# Patient Record
Sex: FEMALE | Race: ASIAN | Employment: OTHER | URBAN - METROPOLITAN AREA
[De-identification: names, ages, dates, MRNs, and addresses within clinical notes are randomized per-mention and may not be internally consistent; named-entity substitution may affect disease eponyms.]

---

## 2017-08-09 ENCOUNTER — HOSPITAL ENCOUNTER (OUTPATIENT)
Dept: MAMMOGRAPHY | Age: 72
Discharge: HOME | End: 2017-08-09
Attending: FAMILY MEDICINE
Payer: MEDICARE

## 2017-08-09 DIAGNOSIS — Z12.31 VISIT FOR SCREENING MAMMOGRAM: ICD-10-CM

## 2017-08-09 PROCEDURE — 77067 SCR MAMMO BI INCL CAD: CPT

## 2018-01-25 ENCOUNTER — HOSPITAL ENCOUNTER (OUTPATIENT)
Dept: BONE DENSITY | Age: 73
Discharge: HOME | End: 2018-01-25
Attending: FAMILY MEDICINE
Payer: MEDICARE

## 2018-01-25 DIAGNOSIS — Z12.31 VISIT FOR SCREENING MAMMOGRAM: ICD-10-CM

## 2018-01-25 PROCEDURE — 77080 DXA BONE DENSITY AXIAL: CPT

## 2018-08-22 ENCOUNTER — HOSPITAL ENCOUNTER (OUTPATIENT)
Dept: MAMMOGRAPHY | Age: 73
Discharge: HOME | End: 2018-08-22
Attending: FAMILY MEDICINE
Payer: MEDICARE

## 2018-08-22 DIAGNOSIS — Z12.39 BREAST CANCER SCREENING: ICD-10-CM

## 2018-08-22 PROCEDURE — 77063 BREAST TOMOSYNTHESIS BI: CPT

## 2018-08-22 PROCEDURE — 77067 SCR MAMMO BI INCL CAD: CPT

## 2019-09-30 ENCOUNTER — HOSPITAL ENCOUNTER (OUTPATIENT)
Dept: MAMMOGRAPHY | Age: 74
Discharge: HOME | End: 2019-09-30
Attending: FAMILY MEDICINE
Payer: MEDICARE

## 2019-09-30 DIAGNOSIS — Z12.31 VISIT FOR SCREENING MAMMOGRAM: ICD-10-CM

## 2019-09-30 PROCEDURE — 77063 BREAST TOMOSYNTHESIS BI: CPT

## 2019-11-04 ENCOUNTER — HOSPITAL ENCOUNTER (OUTPATIENT)
Dept: GENERAL RADIOLOGY | Age: 74
Discharge: HOME | End: 2019-11-04
Payer: MEDICARE

## 2019-11-04 DIAGNOSIS — M54.50 LOW BACK PAIN RADIATING TO LEFT LOWER EXTREMITY: ICD-10-CM

## 2019-11-04 DIAGNOSIS — M79.605 LOW BACK PAIN RADIATING TO LEFT LOWER EXTREMITY: ICD-10-CM

## 2019-11-04 PROCEDURE — 72110 X-RAY EXAM L-2 SPINE 4/>VWS: CPT

## 2020-10-21 ENCOUNTER — TRANSCRIBE ORDER (OUTPATIENT)
Dept: SCHEDULING | Age: 75
End: 2020-10-21

## 2020-10-21 DIAGNOSIS — Z78.0 MENOPAUSE: Primary | ICD-10-CM

## 2020-11-05 ENCOUNTER — HOSPITAL ENCOUNTER (OUTPATIENT)
Dept: BONE DENSITY | Age: 75
Discharge: HOME | End: 2020-11-05
Attending: FAMILY MEDICINE
Payer: MEDICARE

## 2020-11-05 ENCOUNTER — HOSPITAL ENCOUNTER (OUTPATIENT)
Dept: MAMMOGRAPHY | Age: 75
Discharge: HOME | End: 2020-11-05
Attending: FAMILY MEDICINE
Payer: MEDICARE

## 2020-11-05 DIAGNOSIS — Z12.31 ENCOUNTER FOR SCREENING MAMMOGRAM FOR BREAST CANCER: ICD-10-CM

## 2020-11-05 DIAGNOSIS — Z78.0 MENOPAUSE: ICD-10-CM

## 2020-11-05 PROCEDURE — 77080 DXA BONE DENSITY AXIAL: CPT

## 2020-11-05 PROCEDURE — 77063 BREAST TOMOSYNTHESIS BI: CPT

## 2020-11-05 PROCEDURE — 77067 SCR MAMMO BI INCL CAD: CPT

## 2021-05-20 ENCOUNTER — TRANSCRIBE ORDER (OUTPATIENT)
Dept: SCHEDULING | Age: 76
End: 2021-05-20

## 2021-05-20 DIAGNOSIS — Z12.31 VISIT FOR SCREENING MAMMOGRAM: Primary | ICD-10-CM

## 2021-11-08 ENCOUNTER — HOSPITAL ENCOUNTER (OUTPATIENT)
Dept: MAMMOGRAPHY | Age: 76
Discharge: HOME | End: 2021-11-08
Attending: FAMILY MEDICINE
Payer: MEDICARE

## 2021-11-08 DIAGNOSIS — Z12.31 VISIT FOR SCREENING MAMMOGRAM: ICD-10-CM

## 2021-11-08 PROCEDURE — 77063 BREAST TOMOSYNTHESIS BI: CPT

## 2022-04-13 ENCOUNTER — HOSPITAL ENCOUNTER (OUTPATIENT)
Dept: GENERAL RADIOLOGY | Age: 77
Discharge: HOME | End: 2022-04-13
Payer: MEDICARE

## 2022-04-13 DIAGNOSIS — M54.41 ACUTE BILATERAL LOW BACK PAIN WITH BILATERAL SCIATICA: ICD-10-CM

## 2022-04-13 DIAGNOSIS — M54.42 ACUTE BILATERAL LOW BACK PAIN WITH BILATERAL SCIATICA: ICD-10-CM

## 2022-04-13 PROCEDURE — 72114 X-RAY EXAM L-S SPINE BENDING: CPT

## 2022-06-14 ENCOUNTER — OFFICE VISIT (OUTPATIENT)
Dept: ORTHOPEDIC SURGERY | Age: 77
End: 2022-06-14
Payer: MEDICARE

## 2022-06-14 VITALS
WEIGHT: 134 LBS | HEART RATE: 86 BPM | BODY MASS INDEX: 24.66 KG/M2 | TEMPERATURE: 98 F | HEIGHT: 62 IN | OXYGEN SATURATION: 97 %

## 2022-06-14 DIAGNOSIS — R20.0 ARM NUMBNESS: ICD-10-CM

## 2022-06-14 DIAGNOSIS — M54.2 CERVICAL PAIN: ICD-10-CM

## 2022-06-14 DIAGNOSIS — M51.36 DDD (DEGENERATIVE DISC DISEASE), LUMBAR: ICD-10-CM

## 2022-06-14 DIAGNOSIS — M79.605 LUMBAR PAIN WITH RADIATION DOWN BOTH LEGS: ICD-10-CM

## 2022-06-14 DIAGNOSIS — M62.838 MUSCLE SPASM: ICD-10-CM

## 2022-06-14 DIAGNOSIS — M47.816 LUMBAR FACET ARTHROPATHY: ICD-10-CM

## 2022-06-14 DIAGNOSIS — M54.50 LUMBAR PAIN WITH RADIATION DOWN BOTH LEGS: Primary | ICD-10-CM

## 2022-06-14 DIAGNOSIS — M79.605 LUMBAR PAIN WITH RADIATION DOWN BOTH LEGS: Primary | ICD-10-CM

## 2022-06-14 DIAGNOSIS — M54.50 LUMBAR PAIN WITH RADIATION DOWN BOTH LEGS: ICD-10-CM

## 2022-06-14 DIAGNOSIS — M79.604 LUMBAR PAIN WITH RADIATION DOWN BOTH LEGS: ICD-10-CM

## 2022-06-14 DIAGNOSIS — M79.604 LUMBAR PAIN WITH RADIATION DOWN BOTH LEGS: Primary | ICD-10-CM

## 2022-06-14 PROCEDURE — G8399 PT W/DXA RESULTS DOCUMENT: HCPCS | Performed by: PHYSICAL MEDICINE & REHABILITATION

## 2022-06-14 PROCEDURE — G8420 CALC BMI NORM PARAMETERS: HCPCS | Performed by: PHYSICAL MEDICINE & REHABILITATION

## 2022-06-14 PROCEDURE — G8432 DEP SCR NOT DOC, RNG: HCPCS | Performed by: PHYSICAL MEDICINE & REHABILITATION

## 2022-06-14 PROCEDURE — 1123F ACP DISCUSS/DSCN MKR DOCD: CPT | Performed by: PHYSICAL MEDICINE & REHABILITATION

## 2022-06-14 PROCEDURE — 72040 X-RAY EXAM NECK SPINE 2-3 VW: CPT | Performed by: PHYSICAL MEDICINE & REHABILITATION

## 2022-06-14 PROCEDURE — G8536 NO DOC ELDER MAL SCRN: HCPCS | Performed by: PHYSICAL MEDICINE & REHABILITATION

## 2022-06-14 PROCEDURE — 1101F PT FALLS ASSESS-DOCD LE1/YR: CPT | Performed by: PHYSICAL MEDICINE & REHABILITATION

## 2022-06-14 PROCEDURE — 1090F PRES/ABSN URINE INCON ASSESS: CPT | Performed by: PHYSICAL MEDICINE & REHABILITATION

## 2022-06-14 PROCEDURE — 99203 OFFICE O/P NEW LOW 30 MIN: CPT | Performed by: PHYSICAL MEDICINE & REHABILITATION

## 2022-06-14 PROCEDURE — G8427 DOCREV CUR MEDS BY ELIG CLIN: HCPCS | Performed by: PHYSICAL MEDICINE & REHABILITATION

## 2022-06-14 RX ORDER — EMPAGLIFLOZIN 10 MG/1
TABLET, FILM COATED ORAL
COMMUNITY
Start: 2022-03-17

## 2022-06-14 RX ORDER — BIOTIN 1000 MCG
TABLET,CHEWABLE ORAL
COMMUNITY

## 2022-06-14 RX ORDER — UBIDECARENONE 30 MG
30 CAPSULE ORAL
COMMUNITY

## 2022-06-14 RX ORDER — LOSARTAN POTASSIUM 25 MG/1
TABLET ORAL
COMMUNITY
Start: 2022-03-17

## 2022-06-14 RX ORDER — ASPIRIN 325 MG
500 TABLET, DELAYED RELEASE (ENTERIC COATED) ORAL
COMMUNITY

## 2022-06-14 RX ORDER — ROSUVASTATIN CALCIUM 5 MG/1
TABLET, COATED ORAL
COMMUNITY
Start: 2022-03-17

## 2022-06-14 RX ORDER — LANOLIN ALCOHOL/MO/W.PET/CERES
400 CREAM (GRAM) TOPICAL DAILY
COMMUNITY

## 2022-06-14 RX ORDER — ISOPROPYL ALCOHOL 0.75 G/1
SWAB TOPICAL
COMMUNITY
Start: 2022-03-17

## 2022-06-14 RX ORDER — MELATONIN
1000 DAILY
COMMUNITY

## 2022-06-14 RX ORDER — METHYLPREDNISOLONE 4 MG/1
TABLET ORAL
Qty: 1 DOSE PACK | Refills: 0 | Status: SHIPPED | OUTPATIENT
Start: 2022-06-14

## 2022-06-14 RX ORDER — CALCIUM CITRATE/VITAMIN D3 200MG-6.25
TABLET ORAL
COMMUNITY
Start: 2022-03-17

## 2022-06-14 RX ORDER — IBUPROFEN 800 MG/1
TABLET ORAL
COMMUNITY
Start: 2022-04-06

## 2022-06-14 NOTE — PROGRESS NOTES
[facility]  [address] [place] [address], [facility] [address]  Phone: [phone]  Fax: [phone]    NEW PATIENT  Pt's YOB: 1945    ASSESSMENT   Diagnoses and all orders for this visit:    1. Lumbar pain with radiation down both legs  -     MRI LUMB SPINE WO CONT; Future    2. Lumbar facet arthropathy  -     methylPREDNISolone (MEDROL DOSEPACK) 4 mg tablet; Per dose pack instructions  -     MRI LUMB SPINE WO CONT; Future    3. DDD (degenerative disc disease), lumbar  -     MRI LUMB SPINE WO CONT; Future    4. Muscle spasm    5. Cervical pain  -     AMB POC XRAY, SPINE, CERVICAL; 2 OR 3    6. Arm numbness  -     AMB POC XRAY, SPINE, CERVICAL; 2 OR 3         IMPRESSION AND PLAN:  Pt is a 68 yo female with history of lumbar pain and bilateral radicular symptoms that has not improved with conservative care; she is having progressive symptoms including weakness and numbness ; she agrees to lumbar MRI to assess for stenosis, impingement and inflammation    1) Pt was given information on low back arthritis, cervical arthritis  Exercises and lumbar spinal stenosis information. 2) Discussed treatment options with the patient including medications, lumbar injections and indications for surgery. 3) Lumbar MRI ordered to assess for lumbar spinal stenosis, inflammation and other neuro-pathology;   4) Ms. Radha Robb has a reminder for a \"due or due soon\" health maintenance. I have asked that she contact her primary care provider, Herbert Ricardo NP, for follow-up on this health maintenance. 5)  demonstrated consistency with prescribing. 6) Pt given a medrol dose pack to address inflammation  7) Cervical 2V x-rays obtained to check for degenerative changes due to concerns with her balance  Follow-up and Dispositions    · Return in about 4 weeks (around 7/12/2022) for Diagnostic Test follow up, Medication follow up.            HISTORY OF PRESENT ILLNESS:  Thelma Henson Alexander King is a 67 y.o. R hand dominant female with history of lumbar pain x 5 months. Pt presents to the office today as a new patient referred by [facility], NP. Pt's symptoms worsen with standing, walking and lifting and improve with sitting, lying, and bending. Pt has used MDP and ibuprofen in the past with some benefit but does not take medication on a regular basis. She does admit to leg weakness and is having more difficulty with ambulation. She also reports some difficulty with her balance and has difficulty with picking up coins. She also has some aching in her LUE and rates her pain as 5/10 and describes her pain as intermittent, aching and has numbness in her legs and UE. She has not had any previous lumbar surgery. Review of the records demonstrates (02/06/2021) creatinine of 0.6 and eGFR of > 60. Pt desires to proceed with lumbar MRI and MDP and declines other medications. Pain Scale: 5/10     PCP: Ravinder Engle NP    Past Medical History:   Diagnosis Date    Diabetes mellitus ([address]     states diet controlled    High blood pressure     Menopause         Social History     Socioeconomic History    Marital status:      Spouse name: Not on file    Number of children: Not on file    Years of education: Not on file    Highest education level: Not on file   Occupational History    Not on file   Tobacco Use    Smoking status: Never Smoker    Smokeless tobacco: Never Used   Substance and Sexual Activity    Alcohol use: No    Drug use: No    Sexual activity: Not on file   Other Topics Concern    Not on file   Social History Narrative    Not on file     Social Determinants of Health     Financial Resource Strain:     Difficulty of Paying Living Expenses: Not on file   Food Insecurity:     Worried About Running Out of Food in the Last Year: Not on file    Rajesh of Food in the Last Year: Not on file   Transportation Needs:     Lack of Transportation (Medical):  Not on file  Lack of Transportation (Non-Medical): Not on file   Physical Activity:     Days of Exercise per Week: Not on file    Minutes of Exercise per Session: Not on file   Stress:     Feeling of Stress : Not on file   Social Connections:     Frequency of Communication with Friends and Family: Not on file    Frequency of Social Gatherings with Friends and Family: Not on file    Attends [employer] Services: Not on file    Active Member of [address] [place] [address] or Organizations: Not on file    Attends Club or Organization Meetings: Not on file    Marital Status: Not on file   Intimate Partner Violence:     Fear of Current or Ex-Partner: Not on file    Emotionally Abused: Not on file    Physically Abused: Not on file    Sexually Abused: Not on file   Housing Stability:     Unable to Pay for Housing in the Last Year: Not on file    Number of [place] in the Last Year: Not on file    Unstable Housing in the Last Year: Not on file       Current Outpatient Medications   Medication Sig Dispense Refill    BD Single Use Swabs Regular padm       biotin 1,000 mcg chew Take  by mouth.  True Metrix Glucose Test Strip strip       cholecalciferol (VITAMIN D3) (1000 Units /25 mcg) tablet Take 1,000 Units by mouth daily.  Jardiance 10 mg tablet       ibuprofen (MOTRIN) 800 mg tablet TAKE 1 TABLET BY MOUTH EVERY 8 HOURS WITH FOOD AS NEEDED      losartan (COZAAR) 25 mg tablet       magnesium oxide (MAG-OX) 400 mg tablet Take 400 mg by mouth daily.  multivitamins-minerals-lutein (MEN'S CENTRUM SILVER WITH LUTEIN) tab tablet Take 1 Tablet by mouth daily.  omega 3-dha-epa-fish oil 60- mg cap Take 500 mg by mouth.  rosuvastatin (CRESTOR) 5 mg tablet       coenzyme Q-10 (CO Q-10) 30 mg capsule Take 30 mg by mouth.  methylPREDNISolone (MEDROL DOSEPACK) 4 mg tablet Per dose pack instructions 1 Dose Pack 0    AMLODIPINE BESYLATE (NORVASC PO) Take 5 mg by mouth daily.       indapamide (LOZOL) 1.25 mg tablet Take 1.25 mg by mouth daily.  SULFAMETHOXAZOLE/TRIMETHOPRIM (BACTRIM DS PO) Take  by mouth. (Patient not taking: Reported on 6/14/2022)         Allergies   Allergen Reactions    Codeine Itching    Lisinopril Itching       REVIEW OF SYSTEMS    Constitutional: Negative for fever, chills, or weight change. Respiratory: Negative for cough or shortness of breath. Cardiovascular: Negative for chest pain or palpitations. Gastrointestinal: Negative for acid reflux, change in bowel habits, or constipation. Genitourinary: Negative for dysuria and flank pain. Musculoskeletal: Positive for lumbar pain. Skin: Negative for rash. Neurological: Negative for headaches, dizziness; positive for leg numbness.(L>R) and arm numbness; positive for balance issues  Endo/Heme/Allergies: Negative for increased bruising. Psychiatric/Behavioral: Negative for difficulty with sleep. As per HPI    PHYSICAL EXAMINATION  Visit Vitals  Pulse 86   Temp 98 °F (36.7 °C) (Temporal)   Ht 5' 2\" (1.575 m)   Wt 134 lb (60.8 kg)   SpO2 97%   BMI 24.51 kg/m²       Constitutional: Awake, alert, and in no acute distress. HEENT: Normocephalic. Atraumatic. Oropharynx is moist and clear. PERRL. EOMI. Sclerae are nonicteric  Cardiovascular: Regular rate and rhythm  Lungs: Clear to auscultation bilaterally  Abdomen: Soft and nontender. Bowel sounds are present  Neurological: 1+ symmetrical DTRs in the upper extremities. 1+ symmetrical DTRs in the lower extremities. Sensation to light touch is intact. Negative Garrison's sign bilaterally. Skin: warm, dry, and intact. Musculoskeletal: Mild pain with extension, axial loading, and better with forward flexion. No pain with internal or external rotation of her hips. Negative straight leg raise bilaterally.        Biceps  Triceps Deltoids Wrist Ext Wrist Flex Hand Intrin   Right +4/5 +4/5 +4/5 +4/5 +4/5 +4/5   Left +4/5 +4/5 +4/5 +4/5 +4/5 +4/5      Hip Flex  Quads Hamstrings Ankle DF EHL Ankle PF   Right +4/5 +4/5 +4/5 +4/5 +4/5 +4/5   Left +4/5 +4/5 +4/5 +4/5 +4/5 +4/5     IMAGING:  Lumbar x-rays from 04/13/2022 were personally reviewed and demonstrated:    Results  XR SPINE LUMB COMP W BEND (Accession [id number]) (Order [id number])    Allergies       Medium: Codeine; Lisinopril     Exam Information    Status Exam Begun  Exam Ended    Final [99] 4/13/2022 17:04 4/13/2022  5:16 PM [id number]  5:16 PM     Result Information    Status: Final result (Exam End: 4/13/2022 17:16) Provider Status: Open     XR SPINE LUMB COMP W BEND: Patient Communication    Add Comments  Not seen       Study Result    Narrative & Impression   XR SPINE LUMB COMP W BEND: 4/13/2022 5:16 PM     CLINICAL INFORMATION  Acute bilateral low back pain with bilateral sciatica.     COMPARISON  Lumbar spine MRI August 9, 2012, lumbar spine radiographs for November 2019     TECHNIQUE  7 views of the lumbar spine     FINDINGS:  Mild levoconvex curvature of the lumbar spine.     Reversal of the expected lumbar lordosis.     Vertebral body heights are preserved.     Grade 1 anterolisthesis of T11 with respect to T12 and T12 with respect L1.     Grade 1 retrolisthesis of L3 with respect L4 and L4 with respect L5.     No subluxation on dynamic imaging.     No spondylolysis.     Moderate and/or severe discogenic degenerative change throughout the lumbar  spine with intervertebral disc space narrowing and endplate osteophytosis.     IMPRESSION  No acute osseous findings.     Advanced discogenic degenerative change with multilevel grade 1  spondylolisthesis and reversal of the expected lumbar lordosis.  Similar to 2019  exam.       Cervical 2V x-ray 06/14/2022 were personally reviewed and demonstrated:  Straightening of the cervical spine, anterior osteophyte C4/5 and degenerative cervical facets

## 2022-06-14 NOTE — PATIENT INSTRUCTIONS
Low Back Arthritis: Exercises  Introduction  Here are some examples of typical rehabilitation exercises for your condition. Start each exercise slowly. Ease off the exercise if you start to have pain. Your doctor or physical therapist will tell you when you can start these exercises and which ones will work best for you. When you are not being active, find a comfortable position for rest. Some people are comfortable on the floor or a medium-firm bed with a small pillow under their head and another under their knees. Some people prefer to lie on their side with a pillow between their knees. Don't stay in one position for too long. Take short walks (10 to 20 minutes) every 2 to 3 hours. Avoid slopes, hills, and stairs until you feel better. Walk only distances you can manage without pain, especially leg pain. How to do the exercises  Pelvic tilt    1. Lie on your back with your knees bent. 2. \"Brace\" your stomachtighten your muscles by pulling in and imagining your belly button moving toward your spine. 3. Press your lower back into the floor. You should feel your hips and pelvis rock back. 4. Hold for 6 seconds while breathing smoothly. 5. Relax and allow your pelvis and hips to rock forward. 6. Repeat 8 to 12 times. Back stretches    1. Get down on your hands and knees on the floor. 2. Relax your head and allow it to droop. Round your back up toward the ceiling until you feel a nice stretch in your upper, middle, and lower back. Hold this stretch for as long as it feels comfortable, or about 15 to 30 seconds. 3. Return to the starting position with a flat back while you are on your hands and knees. 4. Let your back sway by pressing your stomach toward the floor. Lift your buttocks toward the ceiling. 5. Hold this position for 15 to 30 seconds. 6. Repeat 2 to 4 times. Follow-up care is a key part of your treatment and safety.  Be sure to make and go to all appointments, and call your doctor if you are having problems. It's also a good idea to know your test results and keep a list of the medicines you take. Where can you learn more? Go to http://www.nuPSYS.com/  Enter T094 in the search box to learn more about \"Low Back Arthritis: Exercises. \"  Current as of: July 1, 2021               Content Version: 13.2  © 2006-2022 [employer]. Care instructions adapted under license by [employer] (which disclaims liability or warranty for this information). If you have questions about a medical condition or this instruction, always ask your healthcare professional. [place] [address] any warranty or liability for your use of this information. Lumbar Spinal Stenosis: Care Instructions  Your Care Instructions     Stenosis in the spine is a narrowing of the canal that is around the spinal cord and nerve roots in your back. It can happen as part of aging. Sometimes bone and other tissue grow into this canal and press on the nerves that branch out from the spinal cord. This can cause pain, numbness, and weakness. When it happens in the lower part of your back, it is called lumbar spinal stenosis. It can cause problems in the legs, feet, and rear end (buttocks). You may be able to get relief from the symptoms of spinal stenosis by taking pain medicine. Your doctor may suggest physical therapy and exercises to keep your spine strong and flexible. Some people try steroid shots to reduce swelling. If pain and numbness in your legs are still so bad that you cannot do your normal activities, you may need surgery. Follow-up care is a key part of your treatment and safety. Be sure to make and go to all appointments, and call your doctor if you are having problems. It's also a good idea to know your test results and keep a list of the medicines you take. How can you care for yourself at home? · Take an over-the-counter pain medicine.  Nonsteroidal anti-inflammatory drugs (NSAIDs) such as ibuprofen or naproxen seem to work best. But if you can't take NSAIDs, you can try acetaminophen. Be safe with medicines. Read and follow all instructions on the label. · Do not take two or more pain medicines at the same time unless the doctor told you to. Many pain medicines have acetaminophen, which is Tylenol. Too much acetaminophen (Tylenol) can be harmful. · Stay at a healthy weight. Being overweight puts extra strain on your spine. · Change positions often when you sit or stand. This can ease pain. It may also reduce pressure on the spinal cord and its nerves. · Avoid doing things that make your symptoms worse. Walking downhill and standing for a long time may cause pain. · Stretch and strengthen your back muscles as your doctor or physical therapist recommends. If your doctor says it is okay to do them, these exercises may help. ? Lie on your back with your knees bent. Gently pull one bent knee to your chest. Put that foot back on the floor, and then pull the other knee to your chest.  ? Do pelvic tilts. Lie on your back with your knees bent. Tighten your stomach muscles. Pull your belly button (navel) in and up toward your ribs. You should feel like your back is pressing to the floor and your hips and pelvis are slightly lifting off the floor. Hold for 6 seconds while breathing smoothly. ? Stand with your back flat against a wall. Slowly slide down until your knees are slightly bent. Hold for 10 seconds, then slide back up the wall. · Remove or change anything in your house that may cause you to fall. Keep walkways clear of clutter, electrical cords, and throw rugs. When should you call for help? Call 911 anytime you think you may need emergency care. For example, call if:    · You are unable to move a leg at all. Call your doctor now or seek immediate medical care if:    · You have new or worse symptoms in your legs, belly, or buttocks.  Symptoms may include:  ? Numbness or tingling. ? Weakness. ? Pain.     · You lose bladder or bowel control. Watch closely for changes in your health, and be sure to contact your doctor if:    · You have a fever, lose weight, or don't feel well.     · You are not getting better as expected. Where can you learn more? Go to http://www.gray.com/  Enter X327 in the search box to learn more about \"Lumbar Spinal Stenosis: Care Instructions. \"  Current as of: July 1, 2021               Content Version: 13.2  © 3375-9041 [employer]. Care instructions adapted under license by [employer] (which disclaims liability or warranty for this information). If you have questions about a medical condition or this instruction, always ask your healthcare professional. [address] any warranty or liability for your use of this information. Neck Arthritis: Exercises  Introduction  Here are some examples of exercises for you to try. The exercises may be suggested for a condition or for rehabilitation. Start each exercise slowly. Ease off the exercises if you start to have pain. You will be told when to start these exercises and which ones will work best for you. How to do the exercises  Neck stretches to the side    1. This stretch works best if you keep your shoulder down as you lean away from it. To help you remember to do this, start by relaxing your shoulders and lightly holding on to your thighs or your chair. 2. Tilt your head toward your shoulder and hold for 15 to 30 seconds. Let the weight of your head stretch your muscles. 3. Repeat 2 to 4 times toward each shoulder. Chin tuck    1. Lie on the floor with a rolled-up towel under your neck. Your head should be touching the floor. 2. Slowly bring your chin toward your chest.  3. Hold for a count of 6, and then relax for up to 10 seconds. 4. Repeat 8 to 12 times. Active cervical rotation    1.  Sit in a firm chair, or stand up straight. 2. Keeping your chin level, turn your head to the right, and hold for 15 to 30 seconds. 3. Turn your head to the left and hold for 15 to 30 seconds. 4. Repeat 2 to 4 times to each side. Shoulder blade squeeze    1. While standing, squeeze your shoulder blades together. 2. Do not raise your shoulders up as you are squeezing. 3. Hold for 6 seconds. 4. Repeat 8 to 12 times. Shoulder rolls    1. Sit comfortably with your feet shoulder-width apart. You can also do this exercise standing up. 2. Roll your shoulders up, then back, and then down in a smooth, circular motion. 3. Repeat 2 to 4 times. Follow-up care is a key part of your treatment and safety. Be sure to make and go to all appointments, and call your doctor if you are having problems. It's also a good idea to know your test results and keep a list of the medicines you take. Where can you learn more? Go to http://www.gray.com/  Enter Y188 in the search box to learn more about \"Neck Arthritis: Exercises. \"  Current as of: July 1, 2021               Content Version: 13.2  © 2006-2022 [employer]. Care instructions adapted under license by [employer] (which disclaims liability or warranty for this information). If you have questions about a medical condition or this instruction, always ask your healthcare professional. [place] [address] any warranty or liability for your use of this information.

## 2022-06-14 NOTE — PROGRESS NOTES
"    11/17/2021         RE: Norma Paul  [address] [facility] [address]        Dear Colleague,    Thank you for referring your patient, Norma Paul, to the [facility]. Please see a copy of my visit note below.    Oncology Rooming Note    November 17, 2021 1:26 PM   Norma Paul is a 75 year old female who presents for:    Chief Complaint   Patient presents with     Oncology Clinic Visit     Malignant neoplasm of female breast, unspecified estrogen receptor status, unspecified laterality, unspecified site of breast (H)     Initial Vitals: BP (!) 160/98 (BP Location: Right arm, Patient Position: Sitting, Cuff Size: Adult Regular)   Pulse 80   Temp 98.1  F (36.7  C) (Oral)   Resp 12   Wt 68.5 kg (151 lb)   SpO2 97%   BMI 25.92 kg/m   Estimated body mass index is 25.92 kg/m  as calculated from the following:    Height as of 5/3/21: 1.626 m (5' 4\").    Weight as of this encounter: 68.5 kg (151 lb). Body surface area is 1.76 meters squared.  No Pain (0) Comment: Data Unavailable   No LMP recorded. Patient is postmenopausal.  Allergies reviewed: Yes  Medications reviewed: Yes    Medications: Medication refills not needed today.  Pharmacy name entered into Dig[employer]:    [id number] - [facility], [place] - [address]  [id number] - [place], [place] - [address] [facility] (MAIL SERVICE) [facility] - [place], [place] - [address] AT [address]  [facility] [address] [facility]    Clinical concerns:  Malignant neoplasm of female breast, unspecified estrogen receptor status, unspecified laterality, unspecified site of breast (H)      Lashon Weaver, [facility] Hematology and Oncology Progress Note    Patient: Norma Paul  MRN: [id number]  Date of Service: 11/17/2021        Reason for Visit    Chief Complaint   Patient presents with     Oncology Clinic Visit " David Magaña presents today for   Chief Complaint   Patient presents with    Arm Pain    Leg Pain    Back Pain       Is someone accompanying this pt? no    Is the patient using any DME equipment during OV? no    Depression Screening:  No flowsheet data found. Learning Assessment:  No flowsheet data found. Abuse Screening:  No flowsheet data found. Fall Risk  No flowsheet data found. OPIOID RISK TOOL  No flowsheet data found. Coordination of Care:  1. Have you been to the ER, urgent care clinic since your last visit? no  Hospitalized since your last visit? no    2. Have you seen or consulted any other health care providers outside of the [address] [place] [address] since your last visit? no Include any pap smears or colon screening.  no     Malignant neoplasm of female breast, unspecified estrogen receptor status, unspecified laterality, unspecified site of breast (H)       Assessment and Plan    Cancer Staging  No matching staging information was found for the patient.    1. Breast cancer: stage IA. . Oncotype 12. She is taking anastrozole and tolerating well. Started January 2017.  Mammogram in October was normal, will continue that annually. She will return in 6 months clinical breast exam.  She will then just be seen once a year in May.  She will then continue to get her mammograms in the fall around October/November.  She will stop her anastrozole in February 2022.     2.  osteopenia: she is at high risk for osteoporosis with aromatase inhibitors. She is taking calcium and vit d. Her last DEXA is stable and still showing osteopenia. We will continue calcium/vit d. Encourage weight bearing exercises. Repeat DEXA in 2022.     ECOG Performance    0 - Independent    Distress Screening (within last 30 days)    1. How concerned are you about your ability to eat? : 0  2. How concerned are you about unintended weight loss or your current weight? : 0  3. How concerned are you about feeling depressed or very sad? : 0  4. How concerned are you about feeling anxious or very scared? : 0  5. Do you struggle with the loss of meaning and jose alejandro in your life? : Not at all  6. How concerned are you about work and home life issues that may be affected by your cancer? : 0  7. How concerned are you about knowing what resources are available to help you? : 0  8. Do you currently have what you would describe as Buddhist or spiritual struggles?            : Not at all       Pain  Pain Score: No Pain (0)    Problem List    Patient Active Problem List   Diagnosis     Malignant neoplasm of female breast, unspecified estrogen receptor status, unspecified laterality, unspecified site of breast (H)     Urinary retention     Hyponatremia     JOE (acute kidney injury) (H)      Leukocytosis     Urinary tract infection     Uterine prolapse     Hydronephrosis     Sepsis (H)     Dehydration     Renal stone     Pelvic abscess in female     Diverticulitis of large intestine with perforation and abscess without bleeding     Hypomagnesemia     Hypokalemia     Slow transit constipation     Diverticulitis     Aromatase inhibitor use     Vaginal vault prolapse        ______________________________________________________________________________    History of Present Illness    Measurable disease: None postoperatively     Current therapy: Anastrozole 1 mg by mouth daily started January 1, 2017     Treatment history: Left lumpectomy and then adjuvant radiation therapy, 16 fractions completed December 2016     Interim History: pt is here today for follow up visit. She is doing well. Tolerating anastrozole well. No new issues with breast.     Review of Systems    Pertinent items are noted in HPI.    Past History    Past Medical History:   Diagnosis Date     Breast cancer (H) 2016    right ca     Cystocele with prolapse      Hx of radiation therapy      Hyperlipidemia      Indwelling catheter present on admission      Prolapse, uterovaginal        PHYSICAL EXAM  BP (!) 160/98 (BP Location: Right arm, Patient Position: Sitting, Cuff Size: Adult Regular)   Pulse 80   Temp 98.1  F (36.7  C) (Oral)   Resp 12   Wt 68.5 kg (151 lb)   SpO2 97%   BMI 25.92 kg/m      GENERAL: no acute distress. Cooperative in conversation. Here alone. Mask on  RESP: Regular respiratory rate. No expiratory wheezes   MUSCULOSKELETAL: no bilateral leg swelling  NEURO: non focal. Alert and oriented x3.   PSYCH: within normal limits. No depression or anxiety.  SKIN: exposed skin is dry intact.     Lab Results    No results found for this or any previous visit (from the past 168 hour(s)).    Imaging    MA Screen Bilateral w/Glen    Result Date: 10/26/2021  BILATERAL FULL FIELD DIGITAL SCREENING MAMMOGRAM WITH TOMOSYNTHESIS  Performed on: 10/26/21 Compared to: 10/19/2020, 10/17/2019, 10/16/2018, and 10/10/2017 Technique:  This study was evaluated with the assistance of Computer-Aided Detection.  Breast Tomosynthesis was used in interpretation. Findings: The breasts are heterogeneously dense, which may obscure small masses.  There are breast conservation changes in the right breast. There is no radiographic evidence of malignancy. IMPRESSION: ACR BI-RADS Category 2: Benign RECOMMENDED FOLLOW-UP: Annual routine screening mammogram The results and recommendations of this examination will be communicated to the patient.         Signed by: CRYSTAL Miranda CNP      Again, thank you for allowing me to participate in the care of your patient.        Sincerely,        CRYSTAL Miranda CNP

## 2022-06-27 ENCOUNTER — TELEPHONE (OUTPATIENT)
Dept: ORTHOPEDIC SURGERY | Age: 77
End: 2022-06-27

## 2022-06-27 NOTE — TELEPHONE ENCOUNTER
Arpita Greer from [facility] pre authorization department called stating that the office notes need to be signed by doctor so the patient can be authorized for mri.  Arpita Greer contact [phone]

## 2022-06-30 ENCOUNTER — HOSPITAL ENCOUNTER (OUTPATIENT)
Age: 77
Discharge: HOME | End: 2022-06-30
Attending: PHYSICAL MEDICINE & REHABILITATION
Payer: MEDICARE

## 2022-06-30 PROCEDURE — 72148 MRI LUMBAR SPINE W/O DYE: CPT

## 2022-08-10 ENCOUNTER — OFFICE VISIT (OUTPATIENT)
Dept: ORTHOPEDIC SURGERY | Age: 77
End: 2022-08-10
Payer: MEDICARE

## 2022-08-10 VITALS — HEART RATE: 80 BPM | TEMPERATURE: 96.8 F | BODY MASS INDEX: 23.78 KG/M2 | WEIGHT: 130 LBS | OXYGEN SATURATION: 95 %

## 2022-08-10 DIAGNOSIS — M54.50 LUMBAR PAIN WITH RADIATION DOWN BOTH LEGS: Primary | ICD-10-CM

## 2022-08-10 DIAGNOSIS — M54.16 LUMBAR RADICULITIS: ICD-10-CM

## 2022-08-10 DIAGNOSIS — M79.604 LUMBAR PAIN WITH RADIATION DOWN BOTH LEGS: Primary | ICD-10-CM

## 2022-08-10 DIAGNOSIS — M79.605 LUMBAR PAIN WITH RADIATION DOWN BOTH LEGS: Primary | ICD-10-CM

## 2022-08-10 DIAGNOSIS — M50.30 DDD (DEGENERATIVE DISC DISEASE), CERVICAL: ICD-10-CM

## 2022-08-10 DIAGNOSIS — M62.838 MUSCLE SPASM: ICD-10-CM

## 2022-08-10 DIAGNOSIS — M47.816 LUMBAR FACET ARTHROPATHY: ICD-10-CM

## 2022-08-10 PROCEDURE — 1090F PRES/ABSN URINE INCON ASSESS: CPT | Performed by: PHYSICAL MEDICINE & REHABILITATION

## 2022-08-10 PROCEDURE — 1123F ACP DISCUSS/DSCN MKR DOCD: CPT | Performed by: PHYSICAL MEDICINE & REHABILITATION

## 2022-08-10 PROCEDURE — G8420 CALC BMI NORM PARAMETERS: HCPCS | Performed by: PHYSICAL MEDICINE & REHABILITATION

## 2022-08-10 PROCEDURE — G8427 DOCREV CUR MEDS BY ELIG CLIN: HCPCS | Performed by: PHYSICAL MEDICINE & REHABILITATION

## 2022-08-10 PROCEDURE — G8399 PT W/DXA RESULTS DOCUMENT: HCPCS | Performed by: PHYSICAL MEDICINE & REHABILITATION

## 2022-08-10 PROCEDURE — 1101F PT FALLS ASSESS-DOCD LE1/YR: CPT | Performed by: PHYSICAL MEDICINE & REHABILITATION

## 2022-08-10 PROCEDURE — G8536 NO DOC ELDER MAL SCRN: HCPCS | Performed by: PHYSICAL MEDICINE & REHABILITATION

## 2022-08-10 PROCEDURE — G8432 DEP SCR NOT DOC, RNG: HCPCS | Performed by: PHYSICAL MEDICINE & REHABILITATION

## 2022-08-10 PROCEDURE — 99214 OFFICE O/P EST MOD 30 MIN: CPT | Performed by: PHYSICAL MEDICINE & REHABILITATION

## 2022-08-10 RX ORDER — GABAPENTIN 100 MG/1
CAPSULE ORAL
Qty: 60 CAPSULE | Refills: 1 | Status: SHIPPED | OUTPATIENT
Start: 2022-08-10

## 2022-08-10 NOTE — PATIENT INSTRUCTIONS
Herniated Disc: Exercises  Introduction  Here are some examples of exercises for you to try. The exercises may be suggested for a condition or for rehabilitation. Start each exercise slowly. Ease off the exercises if you start to have pain. You will be told when to start these exercises and which ones will work best for you. How to stay safe  These exercises can help you move easier and feel better. But when you first start doing them, you may have more pain in your back. This is normal. But it is important to pay close attention to your pain during and after each exercise. Keep doing these exercises if your pain stays the same or moves from your leg and buttock more toward the middle of your spine. Pain moving out of your leg and buttock is a good sign. Stop doing these exercises if your pain gets worse in your leg and buttock. Stop if you start to have pain in your leg and buttock that you didn't have before. Be sure to do these exercises in the order they appear. Note how your pain changes before you move to the next one. If your pain is much worse right after exercise and stays worse the next day, do not do any of these exercises. How to do the exercises  1. Rest on belly    Lie on your stomach, with your head turned to the side. Try to relax your lower back muscles as much as you can. Continue to lie on your stomach for 2 minutes. Keep your arms beside your body. If that position bothers your neck, place your hands, one on top of the other, underneath your forehead. This will help support your head and neck. If lying in this position causes or increases pain down your leg, stop this exercise and do not do the next exercises. 2. Press-up back extension    Lie on your stomach, with your face down and your elbows tucked into your sides and under your shoulders. Press your elbows down into the floor to raise your upper back. Hold this position for 15 to 30 seconds. Repeat 2 to 4 times.   As you do this, relax your stomach muscles and allow your back to arch without using your back muscles. Let your low back relax completely as you arch up. Don't let your hips or pelvis come off the floor. Then relax, and return to the start position. Over time, work up to staying in the press-up position for up to 2 minutes. If lying in this position causes or increases pain down your leg, stop this exercise and do not do the next exercises. 3. Full press-up back extension    Lie on your stomach with your face down, keeping your elbows tucked into your sides and under your shoulders. Straighten your elbows, and push your upper body up as far as you can. Hold this position for 5 seconds, and then relax. Repeat 10 times. Allow your lower back to sag. Keep your hips, pelvis, and legs relaxed. Each time, try to raise your upper body a little higher and hold your arms a bit straighter. If lying in this position causes or increases pain down your leg, stop this exercise and do not do the next exercises. If you can't do this exercise, you may instead try the backward bend exercise that follows. 4. Backward bend    Stand with your feet hip-width apart, and don't lock your knees. Place your hands in the small of your back. Bend backward as far as you can, keeping your knees straight. Repeat 2 to 4 times. Your toes should be pointing forward. Hold this position for 2 to 3 seconds. Then return to your starting position. Each time, try to bend backward a little farther, until you bend backward as far as you can. If standing in this position causes or increases pain down your leg, stop this exercise. Follow-up care is a key part of your treatment and safety. Be sure to make and go to all appointments, and call your doctor if you are having problems. It's also a good idea to know your test results and keep a list of the medicines you take. Where can you learn more?   Go to http://www.gray.com/  Enter X649 in the search box to learn more about \"Herniated Disc: Exercises. \"  Current as of: July 1, 2021               Content Version: 13.2  © 2006-2022 [employer]. Care instructions adapted under license by [employer] (which disclaims liability or warranty for this information). If you have questions about a medical condition or this instruction, always ask your healthcare professional. [place] [address] any warranty or liability for your use of this information. Neck Arthritis: Exercises  Introduction  Here are some examples of exercises for you to try. The exercises may be suggested for a condition or for rehabilitation. Start each exercise slowly. Ease off the exercises if you start to have pain. You will be told when to start these exercises and which ones will work best for you. How to do the exercises  Neck stretches to the side    This stretch works best if you keep your shoulder down as you lean away from it. To help you remember to do this, start by relaxing your shoulders and lightly holding on to your thighs or your chair. Tilt your head toward your shoulder and hold for 15 to 30 seconds. Let the weight of your head stretch your muscles. Repeat 2 to 4 times toward each shoulder. Chin tuck    Lie on the floor with a rolled-up towel under your neck. Your head should be touching the floor. Slowly bring your chin toward your chest.  Hold for a count of 6, and then relax for up to 10 seconds. Repeat 8 to 12 times. Active cervical rotation    Sit in a firm chair, or stand up straight. Keeping your chin level, turn your head to the right, and hold for 15 to 30 seconds. Turn your head to the left and hold for 15 to 30 seconds. Repeat 2 to 4 times to each side. Shoulder blade squeeze    While standing, squeeze your shoulder blades together. Do not raise your shoulders up as you are squeezing.   Hold for 6 seconds. Repeat 8 to 12 times. Shoulder rolls    Sit comfortably with your feet shoulder-width apart. You can also do this exercise standing up. Roll your shoulders up, then back, and then down in a smooth, circular motion. Repeat 2 to 4 times. Follow-up care is a key part of your treatment and safety. Be sure to make and go to all appointments, and call your doctor if you are having problems. It's also a good idea to know your test results and keep a list of the medicines you take. Where can you learn more? Go to http://www.gray.com/  Enter W961 in the search box to learn more about \"Neck Arthritis: Exercises. \"  Current as of: July 1, 2021               Content Version: 13.2  © 2006-2022 [employer]. Care instructions adapted under license by [employer] (which disclaims liability or warranty for this information). If you have questions about a medical condition or this instruction, always ask your healthcare professional. [place] [address] any warranty or liability for your use of this information. Low Back Arthritis: Exercises  Introduction  Here are some examples of typical rehabilitation exercises for your condition. Start each exercise slowly. Ease off the exercise if you start to have pain. Your doctor or physical therapist will tell you when you can start these exercises and which ones will work best for you. When you are not being active, find a comfortable position for rest. Some people are comfortable on the floor or a medium-firm bed with a small pillow under their head and another under their knees. Some people prefer to lie on their side with a pillow between their knees. Don't stay in one position for too long. Take short walks (10 to 20 minutes) every 2 to 3 hours. Avoid slopes, hills, and stairs until you feel better. Walk only distances you can manage without pain, especially leg pain.   How to do the exercises  Pelvic tilt    Lie on your back with your knees bent. \"Brace\" your stomach--tighten your muscles by pulling in and imagining your belly button moving toward your spine. Press your lower back into the floor. You should feel your hips and pelvis rock back. Hold for 6 seconds while breathing smoothly. Relax and allow your pelvis and hips to rock forward. Repeat 8 to 12 times. Back stretches    Get down on your hands and knees on the floor. Relax your head and allow it to droop. Round your back up toward the ceiling until you feel a nice stretch in your upper, middle, and lower back. Hold this stretch for as long as it feels comfortable, or about 15 to 30 seconds. Return to the starting position with a flat back while you are on your hands and knees. Let your back sway by pressing your stomach toward the floor. Lift your buttocks toward the ceiling. Hold this position for 15 to 30 seconds. Repeat 2 to 4 times. Follow-up care is a key part of your treatment and safety. Be sure to make and go to all appointments, and call your doctor if you are having problems. It's also a good idea to know your test results and keep a list of the medicines you take. Where can you learn more? Go to http://www.[employer].com/  Enter T094 in the search box to learn more about \"Low Back Arthritis: Exercises. \"  Current as of: July 1, 2021               Content Version: 13.2  © 7658-3842 [employer]. Care instructions adapted under license by [employer] (which disclaims liability or warranty for this information). If you have questions about a medical condition or this instruction, always ask your healthcare professional. [address] any warranty or liability for your use of this information.

## 2022-08-10 NOTE — PROGRESS NOTES
[facility]  [address] [place], [facility] [address]  Phone: [phone]  Fax: [phone]    Pt's YOB: 1945    ASSESSMENT   Diagnoses and all orders for this visit:    1. Lumbar pain with radiation down both legs  -     gabapentin (NEURONTIN) 100 mg capsule; Take 1 cap by mouth in the morning and 1 at night as directed. Indications: neuropathic pain    2. Lumbar facet arthropathy    3. Muscle spasm    4. Lumbar radiculitis  -     gabapentin (NEURONTIN) 100 mg capsule; Take 1 cap by mouth in the morning and 1 at night as directed. Indications: neuropathic pain    5. DDD (degenerative disc disease), cervical       IMPRESSION AND PLAN:  Marsha Gonzalez is a 67 y.o. right hand dominant female with history of lumbar pain and presents to the office today for MRI follow up. Pt continues to complain of pain in her lumbar region with more pain intermittently radiating down bilateral legs compared to her back. She denies taking medication previously for lumbar pain except for Tylenol which provided minimal relief. 1) Pt was given information on herniated disc, cervical and arthritis exercises. 2) Lumbar MRI from 06/30/2022 was reviewed with the pt at today's visit. 3) Discussed treatment options with the patient including medications, lumbar injections and physical therapy. 4) Pt was prescribed Neurontin 100 mg 1 tab in the morning and 1 tab QHS as directed and will continue taking Tylenol for pain relief. 5) Ms. Star Trejo has a reminder for a \"due or due soon\" health maintenance. I have asked that she contact her primary care provider, Charlaine Meckel, NP, for follow-up on this health maintenance. 6)  demonstrated consistency with prescribing. 7) I recommended the patient try water exercise, nadia chi, and yoga. 8) Demonstrated proper lifting techniques with pt and she received information on the [employer].    9) Cervical x-rays also reviewed  Follow-up and Dispositions    Return in about 6 weeks (around 9/21/2022) for Medication follow up. HISTORY OF PRESENT ILLNESS:  Theodora Jennings is a 67 y.o. right hand dominant female with history of lumbar pain and presents to the office today for MRI follow up. Pt continues to complain of pain in her lumbar region with more pain intermittently radiating down bilateral legs compared to her back. She further notes her pain level being a 5/10 with more pain radiating into her left leg. Pt admits to numbness throughout her left leg with pain being exacerbated with weather change. She further admits to difficulty with sleep with pain and numbness occur. Pt denies taking medication previously for lumbar pain except for Tylenol which provided minimal relief. She notes never having previously had lumbar surgery. Pt at this time desires to proceed with medication evaluation.  .    Pain Scale: 5/10    PCP: Aryan Elias NP     Past Medical History:   Diagnosis Date    Diabetes mellitus ([facility] [address]     states diet controlled    High blood pressure     Menopause         Social History     Socioeconomic History    Marital status:      Spouse name: Not on file    Number of children: Not on file    Years of education: Not on file    Highest education level: Not on file   Occupational History    Not on file   Tobacco Use    Smoking status: Never    Smokeless tobacco: Never   Substance and Sexual Activity    Alcohol use: No    Drug use: No    Sexual activity: Not on file   Other Topics Concern    Not on file   Social History Narrative    Not on file     Social Determinants of Health     Financial Resource Strain: Not on file   Food Insecurity: Not on file   Transportation Needs: Not on file   Physical Activity: Not on file   Stress: Not on file   Social Connections: Not on file   Intimate Partner Violence: Not on file   Housing Stability: Not on file       Current Outpatient Medications   Medication Sig Dispense Refill    gabapentin (NEURONTIN) 100 mg capsule Take 1 cap by mouth in the morning and 1 at night as directed. Indications: neuropathic pain 60 Capsule 1    BD Single Use Swabs Regular padm       biotin 1,000 mcg chew Take  by mouth. True Metrix Glucose Test Strip strip       cholecalciferol (VITAMIN D3) (1000 Units /25 mcg) tablet Take 1,000 Units by mouth daily. Jardiance 10 mg tablet       ibuprofen (MOTRIN) 800 mg tablet TAKE 1 TABLET BY MOUTH EVERY 8 HOURS WITH FOOD AS NEEDED      losartan (COZAAR) 25 mg tablet       magnesium oxide (MAG-OX) 400 mg tablet Take 400 mg by mouth daily. multivitamins-minerals-lutein (MEN'S CENTRUM SILVER WITH LUTEIN) tab tablet Take 1 Tablet by mouth daily. omega 3-dha-epa-fish oil 60- mg cap Take 500 mg by mouth. rosuvastatin (CRESTOR) 5 mg tablet       coenzyme Q-10 (CO Q-10) 30 mg capsule Take 30 mg by mouth. AMLODIPINE BESYLATE (NORVASC PO) Take 5 mg by mouth daily. indapamide (LOZOL) 1.25 mg tablet Take 1.25 mg by mouth daily. methylPREDNISolone (MEDROL DOSEPACK) 4 mg tablet Per dose pack instructions (Patient not taking: Reported on 8/10/2022) 1 Dose Pack 0    SULFAMETHOXAZOLE/TRIMETHOPRIM (BACTRIM DS PO) Take  by mouth. (Patient not taking: No sig reported)         Allergies   Allergen Reactions    Codeine Itching    Lisinopril Itching         REVIEW OF SYSTEMS    Constitutional: Negative for fever, chills, or weight change. Respiratory: Negative for cough or shortness of breath. Cardiovascular: Negative for chest pain or palpitations. Gastrointestinal: Negative for acid reflux, change in bowel habits, or constipation. Genitourinary: Negative for dysuria and flank pain. Musculoskeletal: Positive for lumbar and bilateral leg pain. Skin: Negative for rash. Neurological: Negative for headaches, dizziness, or numbness. Endo/Heme/Allergies: Negative for increased bruising.    Psychiatric/Behavioral: Positive for difficulty with sleep. As per HPI    PHYSICAL EXAMINATION  Visit Vitals  Pulse 80   Temp 96.8 °F (36 °C) (Temporal)   Wt 130 lb (59 kg)   SpO2 95%   BMI 23.78 kg/m²       Constitutional: Awake, alert, and in no acute distress. Neurological: Sensation to light touch is intact. Negative Garrison's sign bilaterally. Skin: warm, dry, and intact. Musculoskeletal: No pain with extension, axial loading, or forward flexion. No pain with internal or external rotation of her hips. Negative straight leg raise bilaterally. Biceps  Triceps Deltoids Wrist Ext Wrist Flex Hand Intrin   Right +4/5 +4/5 +4/5 +4/5 +4/5 +4/5   Left +4/5 +4/5 +4/5 +4/5 +4/5 +4/5      Hip Flex  Quads Hamstrings Ankle DF EHL Ankle PF   Right +4/5 +4/5 +4/5 +4/5 +4/5 +4/5   Left +4/5 +4/5 +4/5 +4/5 +4/5 +4/5     IMAGING:    Lumbar spine MRI from 06/30/2022 was personally reviewed and demonstrated with the patient:    MRI Results (most recent):  Results from Orders Only encounter on 06/14/22    MRI LUMB SPINE WO CONT    Narrative  MRI LUMB SPINE WO CONT: 6/30/2022 11:04 AM    CLINICAL INFORMATION  Lower back pain with lower extremity radicular symptoms, lumbar facet  arthropathy    COMPARISON  Lumbar spine radiographs November 4, 2019, lumbar spine MRI 9 August 2012    TECHNIQUE  Multiplanar MR images of the lumbar spine obtained including T1 and T2-weighted  sequences. FINDINGS  For discussion purposes, the first axial T2-weighted image defines the superior  endplate of N62. Vertebral body height and alignment: Levoconvex curvature of the lumbar spine. Grade 1 anterolisthesis of T11 with respect to T12 and T12 with respect L1. Grade 1 retrolisthesis of L1 with respect to L2, L2 with respect to L3, L3 with  respect to L4 and L4 with respect L5. No evidence of acute fracture. Bone marrow signal: No pathologic infiltration. Disproportionate endplate  reactive marrow edema at L1-L2 and L3-L4.     Conus/filum: Normal in appearance and terminates at an appropriate level. Tarlov  cysts are noted. Intervertebral disc height: Disproportionate intervertebral disc space narrowing  at L1-L2, L2-L3, and L3-L4. Paraspinal tissues: Circumscribed T2 hyperintensity of the right kidney is  incompletely characterized, statistically a benign cyst.    Specific segmental anatomy is as follows:    T9-T10 and T10-T11: Evaluated only on sagittal sequences. Mild broad-based disc  bulge at these levels without significant central canal or foraminal narrowing. T11-T12: Mild broad-based disc bulge asymmetric to the left. No significant  central canal or right foraminal narrowing. Mild/moderate left foraminal  narrowing. T12-L1: Grade 1 anterolisthesis. Broad-based disc bulge with superimposed right  paracentral/subarticular protrusion type disc herniation. Mild central canal  narrowing. No significant foraminal narrowing. L1-2: Grade 1 retrolisthesis. Broad-based disc osteophyte complex. Mild central  canal narrowing. No significant foraminal narrowing. L2-3: Grade 1 retrolisthesis. Broad-based disc osteophyte complex. Mild central  canal narrowing. No significant left foraminal narrowing. Mild right foraminal  narrowing. L3-4: Grade 1 retrolisthesis. Broad-based disc osteophyte complex asymmetric to  the left. Asymmetric moderate left facet arthropathy. Asymmetric moderate/severe  left foraminal narrowing. Mild right foraminal narrowing. Mild central canal  narrowing    L4-5: Broad-based disc osteophyte complex superimposed on moderate facet  arthrosis. Asymmetric moderate/severe left foraminal narrowing. Mild/moderate  right foraminal narrowing. Mild central canal narrowing. L5-S1: Broad-based disc osteophyte complex superimposed on mild facet arthrosis. Moderate bilateral foraminal narrowing, left greater than right. Slight  contact/impingement of bilateral S1 nerve roots within their respective  subarticular recess course.  No significant central canal narrowing    Impression  When compared with 2012 lumbar spine MRI, progressive degenerative changes  throughout the lumbar spine in the setting of levoconvex curvature and  multilevel grade 1 spondylolisthesis. Foraminal narrowing most advanced on the left at L3-L4 and L4-L5. No high-grade central canal narrowing throughout the lumbar spinal column. Please refer to the body of the report for a comprehensive segmental analysis of  the lumbar spinal column. Lumbar x-rays from 04/13/2022 were personally reviewed and demonstrated:     Results  XR SPINE LUMB COMP W BEND (Accession [id number]) (Order [id number])     Allergies        Medium: Codeine; Lisinopril      Exam Information     Status Exam Begun   Exam Ended     Final [99] 4/13/2022 17:04 4/13/2022  5:16 PM [id number]  5:16 PM      Result Information     Status: Final result (Exam End: 4/13/2022 17:16) Provider Status: Open      XR SPINE LUMB COMP W BEND: Patient Communication     Add Comments  Not seen         Study Result     Narrative & Impression   XR SPINE LUMB COMP W BEND: 4/13/2022 5:16 PM     CLINICAL INFORMATION  Acute bilateral low back pain with bilateral sciatica. COMPARISON  Lumbar spine MRI August 9, 2012, lumbar spine radiographs for November 2019     TECHNIQUE  7 views of the lumbar spine     FINDINGS:  Mild levoconvex curvature of the lumbar spine. Reversal of the expected lumbar lordosis. Vertebral body heights are preserved. Grade 1 anterolisthesis of T11 with respect to T12 and T12 with respect L1. Grade 1 retrolisthesis of L3 with respect L4 and L4 with respect L5. No subluxation on dynamic imaging. No spondylolysis. Moderate and/or severe discogenic degenerative change throughout the lumbar  spine with intervertebral disc space narrowing and endplate osteophytosis. IMPRESSION  No acute osseous findings.      Advanced discogenic degenerative change with multilevel grade 1  spondylolisthesis and reversal of the expected lumbar lordosis. Similar to 2019  exam.         Cervical 2V x-ray 06/14/2022 were personally reviewed and demonstrated:  Straightening of the cervical spine, anterior osteophyte C4/5 and degenerative cervical facets      Written by Oscar Love, as dictated by Von Jang MD.  I, Dr. Von Jang confirm that all documentation is accurate.

## 2022-08-10 NOTE — LETTER
8/11/2022    Patient: Diamante Theodore   YOB: 1945   Date of Visit: 8/10/2022     Héctor Reddy NP  [address]  [facility] [address] [place] [address] [id number]  Via Fax: [phone]    Dear Héctor Reddy NP,      Thank you for referring Ms. Duncan Antony to [facility] for evaluation. My notes for this consultation are attached. If you have questions, please do not hesitate to call me. I look forward to following your patient along with you.       Sincerely,    Sherman Banda MD

## 2022-09-01 ENCOUNTER — TRANSCRIBE ORDER (OUTPATIENT)
Dept: SCHEDULING | Age: 77
End: 2022-09-01

## 2022-09-01 DIAGNOSIS — Z12.31 VISIT FOR SCREENING MAMMOGRAM: Primary | ICD-10-CM

## 2022-09-29 NOTE — PROGRESS NOTES
[facility]  [address] [place] [address], [facility] [address]  Phone: [phone]  Fax: [phone]    Pt's YOB: 1945    ASSESSMENT   Diagnoses and all orders for this visit:    1. Spinal stenosis of lumbar region without neurogenic claudication  -     gabapentin (NEURONTIN) 300 mg capsule; Take 1 cap by mouth in the morning and 2 caps at night as directed. 2. Lumbar radiculitis  -     gabapentin (NEURONTIN) 300 mg capsule; Take 1 cap by mouth in the morning and 2 caps at night as directed. 3. Lumbar facet arthropathy  -     methylPREDNISolone (MEDROL DOSEPACK) 4 mg tablet; Per dose pack instructions    4. Muscle spasm       IMPRESSION AND PLAN:  Pao Quijano is a 67 y.o. right hand dominant female with history of lumbar pain and presents to the office today for medication follow up, accompanied by her  and daughter. Pt continues to complain of pain in the lumbar region intermittently radiating into the right leg, with no pain in the left. She is taking Neurontin 100 mg 1 tab in the morning and 1 tab QHS as directed with relief and denies dizziness or sedation. 1) Pt was given information on herniated disc and lumbar arthritis exercises. 2) She will increase her dosage of Neurontin to 300 mg 1 cap QAM and 2 caps QHS as directed. 3) Lumbar MRI from 06/30/2022 was reviewed with the pt at today's visit. 4) A Medrol Dosepak was prescribed for acute inflammatory pain to take as directed. 5) Ms. Richa Johns has a reminder for a \"due or due soon\" health maintenance. I have asked that she contact her primary care provider, Ricardo Benitez NP, for follow-up on this health maintenance. 6)  demonstrated consistency with prescribing. Follow-up and Dispositions    Return in about 6 weeks (around 11/14/2022) for Medication follow up.              HISTORY OF PRESENT ILLNESS:  Pao Quijano is a 67 y.o. right hand dominant female with history of lumbar pain and presents to the office today for medication follow up, accompanied by her  and daughter. Pt continues to complain of pain in the lumbar region intermittently radiating into the right leg down to the foot, with numbness in the left leg. She reports her pain is alleviated with walking to the point where she can 'walk 12 hours' without any pain but after sitting for 10-30 minutes she has severe pain in the right leg. Her daughter notes due to the severity of the pain the pt has tried acupuncture and is having difficulty with sleep due to the pain. She further mentions the pain is exacerbated when the patient bends over to lift her . Pt is taking Neurontin 100 mg 1 tab in the morning and 1 tab QHS as directed with relief and denies dizziness or sedation. She is also taking ibuprofen 800 mg as needed, prescribed by her PCP. Pt at this time desires to proceed with medication evaluation, increasing her Neurontin dosage to 300 mg 1 cap QAM and 2 caps QHS. 30 minutes was spent with pt and  her daughter extensively discussing her symptoms, reviewing medication, discussing injections, indications for surgery and current treatment plan.     Pain Scale: 10 - Worst pain ever/10    PCP: Mirella Manzano NP     Past Medical History:   Diagnosis Date    Diabetes mellitus ([facility] [address]     states diet controlled    High blood pressure     Menopause         Social History     Socioeconomic History    Marital status:      Spouse name: Not on file    Number of children: Not on file    Years of education: Not on file    Highest education level: Not on file   Occupational History    Not on file   Tobacco Use    Smoking status: Never    Smokeless tobacco: Never   Vaping Use    Vaping Use: Never used   Substance and Sexual Activity    Alcohol use: No    Drug use: No    Sexual activity: Not on file   Other Topics Concern    Not on file   Social History Narrative    Not on file     Social Determinants of Health Financial Resource Strain: Not on file   Food Insecurity: Not on file   Transportation Needs: Not on file   Physical Activity: Not on file   Stress: Not on file   Social Connections: Not on file   Intimate Partner Violence: Not on file   Housing Stability: Not on file       Current Outpatient Medications   Medication Sig Dispense Refill    gabapentin (NEURONTIN) 300 mg capsule Take 1 cap by mouth in the morning and 2 caps at night as directed. 90 Capsule 1    methylPREDNISolone (MEDROL DOSEPACK) 4 mg tablet Per dose pack instructions 1 Dose Pack 0    gabapentin (NEURONTIN) 100 mg capsule Take 1 cap by mouth in the morning and 1 at night as directed. Indications: neuropathic pain 60 Capsule 1    BD Single Use Swabs Regular padm  (Patient not taking: Reported on 10/3/2022)      biotin 1,000 mcg chew Take  by mouth. True Metrix Glucose Test Strip strip       cholecalciferol (VITAMIN D3) (1000 Units /25 mcg) tablet Take 1,000 Units by mouth daily. Jardiance 10 mg tablet       ibuprofen (MOTRIN) 800 mg tablet TAKE 1 TABLET BY MOUTH EVERY 8 HOURS WITH FOOD AS NEEDED      losartan (COZAAR) 25 mg tablet       magnesium oxide (MAG-OX) 400 mg tablet Take 400 mg by mouth daily. multivitamins-minerals-lutein (MEN'S CENTRUM SILVER WITH LUTEIN) tab tablet Take 1 Tablet by mouth daily. omega 3-dha-epa-fish oil 60- mg cap Take 500 mg by mouth. rosuvastatin (CRESTOR) 5 mg tablet       coenzyme Q-10 (CO Q-10) 30 mg capsule Take 30 mg by mouth.      methylPREDNISolone (MEDROL DOSEPACK) 4 mg tablet Per dose pack instructions 1 Dose Pack 0    SULFAMETHOXAZOLE/TRIMETHOPRIM (BACTRIM DS PO) Take  by mouth. (Patient not taking: No sig reported)      AMLODIPINE BESYLATE (NORVASC PO) Take 5 mg by mouth daily. indapamide (LOZOL) 1.25 mg tablet Take 1.25 mg by mouth daily.          Allergies   Allergen Reactions    Codeine Itching    Lisinopril Itching         REVIEW OF SYSTEMS    Constitutional: Negative for fever, chills, or weight change. Respiratory: Negative for cough or shortness of breath. Cardiovascular: Negative for chest pain or palpitations. Gastrointestinal: Negative for acid reflux, change in bowel habits, or constipation. Genitourinary: Negative for dysuria and flank pain. Musculoskeletal: Positive for lumbar and right leg pain. Skin: Negative for rash. Neurological: Negative for headaches or dizziness. Positive for left leg numbness. Endo/Heme/Allergies: Negative for increased bruising. Psychiatric/Behavioral: Positive for difficulty with sleep. As per HPI    PHYSICAL EXAMINATION  Visit Vitals  /64 (BP 1 Location: Left upper arm, BP Patient Position: Sitting, BP Cuff Size: Adult)   Pulse 85   Temp 97.4 °F (36.3 °C) (Temporal)   Resp 18   Ht 5' (1.524 m)   Wt 131 lb (59.4 kg)   SpO2 96% Comment: RA   BMI 25.58 kg/m²       Constitutional: Awake, alert, and in no acute distress. Neurological: Sensation to light touch is intact. Negative Garrison's sign bilaterally. Skin: warm, dry, and intact. Musculoskeletal: Pain with extension. No pain with axial loading or forward flexion. No pain with internal or external rotation of her hips. Negative straight leg raise bilaterally.       Biceps  Triceps Deltoids Wrist Ext Wrist Flex Hand Intrin   Right +4/5 +4/5 +4/5 +4/5 +4/5 +4/5   Left +4/5 +4/5 +4/5 +4/5 +4/5 +4/5      Hip Flex  Quads Hamstrings Ankle DF EHL Ankle PF   Right +4/5 +4/5 +4/5 +4/5 +4/5 +4/5   Left +4/5 +4/5 +4/5 +4/5 +4/5 +4/5     IMAGING:    Lumbar spine MRI from 06/30/2022 was personally reviewed and demonstrated with the patient:     MRI Results (most recent):  Results from Orders Only encounter on 06/14/22     MRI LUMB SPINE WO CONT     Narrative  MRI LUMB SPINE WO CONT: 6/30/2022 11:04 AM     CLINICAL INFORMATION  Lower back pain with lower extremity radicular symptoms, lumbar facet  arthropathy     COMPARISON  Lumbar spine radiographs November 4, 2019, lumbar spine MRI 9 August 2012     TECHNIQUE  Multiplanar MR images of the lumbar spine obtained including T1 and T2-weighted  sequences. FINDINGS  For discussion purposes, the first axial T2-weighted image defines the superior  endplate of C79. Vertebral body height and alignment: Levoconvex curvature of the lumbar spine. Grade 1 anterolisthesis of T11 with respect to T12 and T12 with respect L1. Grade 1 retrolisthesis of L1 with respect to L2, L2 with respect to L3, L3 with  respect to L4 and L4 with respect L5. No evidence of acute fracture. Bone marrow signal: No pathologic infiltration. Disproportionate endplate  reactive marrow edema at L1-L2 and L3-L4. Conus/filum: Normal in appearance and terminates at an appropriate level. Tarlov  cysts are noted. Intervertebral disc height: Disproportionate intervertebral disc space narrowing  at L1-L2, L2-L3, and L3-L4. Paraspinal tissues: Circumscribed T2 hyperintensity of the right kidney is  incompletely characterized, statistically a benign cyst.     Specific segmental anatomy is as follows:     T9-T10 and T10-T11: Evaluated only on sagittal sequences. Mild broad-based disc  bulge at these levels without significant central canal or foraminal narrowing. T11-T12: Mild broad-based disc bulge asymmetric to the left. No significant  central canal or right foraminal narrowing. Mild/moderate left foraminal  narrowing. T12-L1: Grade 1 anterolisthesis. Broad-based disc bulge with superimposed right  paracentral/subarticular protrusion type disc herniation. Mild central canal  narrowing. No significant foraminal narrowing. L1-2: Grade 1 retrolisthesis. Broad-based disc osteophyte complex. Mild central  canal narrowing. No significant foraminal narrowing. L2-3: Grade 1 retrolisthesis. Broad-based disc osteophyte complex. Mild central  canal narrowing. No significant left foraminal narrowing. Mild right foraminal  narrowing.      L3-4: Grade 1 retrolisthesis. Broad-based disc osteophyte complex asymmetric to  the left. Asymmetric moderate left facet arthropathy. Asymmetric moderate/severe  left foraminal narrowing. Mild right foraminal narrowing. Mild central canal  narrowing     L4-5: Broad-based disc osteophyte complex superimposed on moderate facet  arthrosis. Asymmetric moderate/severe left foraminal narrowing. Mild/moderate  right foraminal narrowing. Mild central canal narrowing. L5-S1: Broad-based disc osteophyte complex superimposed on mild facet arthrosis. Moderate bilateral foraminal narrowing, left greater than right. Slight  contact/impingement of bilateral S1 nerve roots within their respective  subarticular recess course. No significant central canal narrowing     Impression  When compared with 2012 lumbar spine MRI, progressive degenerative changes  throughout the lumbar spine in the setting of levoconvex curvature and  multilevel grade 1 spondylolisthesis. Foraminal narrowing most advanced on the left at L3-L4 and L4-L5. No high-grade central canal narrowing throughout the lumbar spinal column. Please refer to the body of the report for a comprehensive segmental analysis of  the lumbar spinal column. Lumbar x-rays from 04/13/2022 were personally reviewed and demonstrated:     Results  XR SPINE LUMB COMP W BEND (Accession [id number]) (Order [id number])     Allergies        Medium: Codeine; Lisinopril      Exam Information     Status Exam Begun   Exam Ended     Final [99] 4/13/2022 17:04 4/13/2022  5:16 PM [id number]  5:16 PM      Result Information     Status: Final result (Exam End: 4/13/2022 17:16) Provider Status: Open      XR SPINE LUMB COMP W BEND: Patient Communication     Add Comments  Not seen         Study Result     Narrative & Impression   XR SPINE LUMB COMP W BEND: 4/13/2022 5:16 PM     CLINICAL INFORMATION  Acute bilateral low back pain with bilateral sciatica.      COMPARISON  Lumbar spine MRI August 9, 2012, lumbar spine radiographs for November 2019     TECHNIQUE  7 views of the lumbar spine     FINDINGS:  Mild levoconvex curvature of the lumbar spine. Reversal of the expected lumbar lordosis. Vertebral body heights are preserved. Grade 1 anterolisthesis of T11 with respect to T12 and T12 with respect L1. Grade 1 retrolisthesis of L3 with respect L4 and L4 with respect L5. No subluxation on dynamic imaging. No spondylolysis. Moderate and/or severe discogenic degenerative change throughout the lumbar  spine with intervertebral disc space narrowing and endplate osteophytosis. IMPRESSION  No acute osseous findings. Advanced discogenic degenerative change with multilevel grade 1  spondylolisthesis and reversal of the expected lumbar lordosis. Similar to 2019  exam.         Cervical 2V x-ray 06/14/2022 were personally reviewed and demonstrated:  Straightening of the cervical spine, anterior osteophyte C4/5 and degenerative cervical facets    Written by Ryanne Conner, as dictated by Germaine De Souza MD.  I, Dr. Germaine De Souza confirm that all documentation is accurate.

## 2022-10-03 ENCOUNTER — OFFICE VISIT (OUTPATIENT)
Dept: ORTHOPEDIC SURGERY | Age: 77
End: 2022-10-03
Payer: MEDICARE

## 2022-10-03 VITALS
WEIGHT: 131 LBS | TEMPERATURE: 97.4 F | SYSTOLIC BLOOD PRESSURE: 133 MMHG | RESPIRATION RATE: 18 BRPM | HEART RATE: 85 BPM | BODY MASS INDEX: 25.72 KG/M2 | HEIGHT: 60 IN | OXYGEN SATURATION: 96 % | DIASTOLIC BLOOD PRESSURE: 64 MMHG

## 2022-10-03 DIAGNOSIS — M47.816 LUMBAR FACET ARTHROPATHY: ICD-10-CM

## 2022-10-03 DIAGNOSIS — M54.16 LUMBAR RADICULITIS: ICD-10-CM

## 2022-10-03 DIAGNOSIS — M48.061 SPINAL STENOSIS OF LUMBAR REGION WITHOUT NEUROGENIC CLAUDICATION: Primary | ICD-10-CM

## 2022-10-03 DIAGNOSIS — M62.838 MUSCLE SPASM: ICD-10-CM

## 2022-10-03 PROCEDURE — G8399 PT W/DXA RESULTS DOCUMENT: HCPCS | Performed by: PHYSICAL MEDICINE & REHABILITATION

## 2022-10-03 PROCEDURE — 1101F PT FALLS ASSESS-DOCD LE1/YR: CPT | Performed by: PHYSICAL MEDICINE & REHABILITATION

## 2022-10-03 PROCEDURE — 99214 OFFICE O/P EST MOD 30 MIN: CPT | Performed by: PHYSICAL MEDICINE & REHABILITATION

## 2022-10-03 PROCEDURE — G8427 DOCREV CUR MEDS BY ELIG CLIN: HCPCS | Performed by: PHYSICAL MEDICINE & REHABILITATION

## 2022-10-03 PROCEDURE — G8536 NO DOC ELDER MAL SCRN: HCPCS | Performed by: PHYSICAL MEDICINE & REHABILITATION

## 2022-10-03 PROCEDURE — G8432 DEP SCR NOT DOC, RNG: HCPCS | Performed by: PHYSICAL MEDICINE & REHABILITATION

## 2022-10-03 PROCEDURE — 1090F PRES/ABSN URINE INCON ASSESS: CPT | Performed by: PHYSICAL MEDICINE & REHABILITATION

## 2022-10-03 PROCEDURE — 1123F ACP DISCUSS/DSCN MKR DOCD: CPT | Performed by: PHYSICAL MEDICINE & REHABILITATION

## 2022-10-03 PROCEDURE — G8417 CALC BMI ABV UP PARAM F/U: HCPCS | Performed by: PHYSICAL MEDICINE & REHABILITATION

## 2022-10-03 RX ORDER — GABAPENTIN 300 MG/1
CAPSULE ORAL
Qty: 90 CAPSULE | Refills: 1 | Status: SHIPPED | OUTPATIENT
Start: 2022-10-03

## 2022-10-03 RX ORDER — METHYLPREDNISOLONE 4 MG/1
TABLET ORAL
Qty: 1 DOSE PACK | Refills: 0 | Status: SHIPPED | OUTPATIENT
Start: 2022-10-03

## 2022-10-03 NOTE — PATIENT INSTRUCTIONS
Herniated Disc: Exercises  Introduction  Here are some examples of exercises for you to try. The exercises may be suggested for a condition or for rehabilitation. Start each exercise slowly. Ease off the exercises if you start to have pain. You will be told when to start these exercises and which ones will work best for you. How to stay safe  These exercises can help you move easier and feel better. But when you first start doing them, you may have more pain in your back. This is normal. But it is important to pay close attention to your pain during and after each exercise. Keep doing these exercises if your pain stays the same or moves from your leg and buttock more toward the middle of your spine. Pain moving out of your leg and buttock is a good sign. Stop doing these exercises if your pain gets worse in your leg and buttock. Stop if you start to have pain in your leg and buttock that you didn't have before. Be sure to do these exercises in the order they appear. Note how your pain changes before you move to the next one. If your pain is much worse right after exercise and stays worse the next day, do not do any of these exercises. How to do the exercises  1. Rest on belly    Lie on your stomach, with your head turned to the side. Try to relax your lower back muscles as much as you can. Continue to lie on your stomach for 2 minutes. Keep your arms beside your body. If that position bothers your neck, place your hands, one on top of the other, underneath your forehead. This will help support your head and neck. If lying in this position causes or increases pain down your leg, stop this exercise and do not do the next exercises. 2. Press-up back extension    Lie on your stomach, with your face down and your elbows tucked into your sides and under your shoulders. Press your elbows down into the floor to raise your upper back. Hold this position for 15 to 30 seconds. Repeat 2 to 4 times.   As you do this, relax your stomach muscles and allow your back to arch without using your back muscles. Let your low back relax completely as you arch up. Don't let your hips or pelvis come off the floor. Then relax, and return to the start position. Over time, work up to staying in the press-up position for up to 2 minutes. If lying in this position causes or increases pain down your leg, stop this exercise and do not do the next exercises. 3. Full press-up back extension    Lie on your stomach with your face down, keeping your elbows tucked into your sides and under your shoulders. Straighten your elbows, and push your upper body up as far as you can. Hold this position for 5 seconds, and then relax. Repeat 10 times. Allow your lower back to sag. Keep your hips, pelvis, and legs relaxed. Each time, try to raise your upper body a little higher and hold your arms a bit straighter. If lying in this position causes or increases pain down your leg, stop this exercise and do not do the next exercises. If you can't do this exercise, you may instead try the backward bend exercise that follows. 4. Backward bend    Stand with your feet hip-width apart, and don't lock your knees. Place your hands in the small of your back. Bend backward as far as you can, keeping your knees straight. Repeat 2 to 4 times. Your toes should be pointing forward. Hold this position for 2 to 3 seconds. Then return to your starting position. Each time, try to bend backward a little farther, until you bend backward as far as you can. If standing in this position causes or increases pain down your leg, stop this exercise. Follow-up care is a key part of your treatment and safety. Be sure to make and go to all appointments, and call your doctor if you are having problems. It's also a good idea to know your test results and keep a list of the medicines you take. Where can you learn more?   Go to http://www.gray.com/  Enter W335 in the search box to learn more about \"Herniated Disc: Exercises. \"  Current as of: July 1, 2021               Content Version: 13.2  © 2006-2022 [employer]. Care instructions adapted under license by [employer] (which disclaims liability or warranty for this information). If you have questions about a medical condition or this instruction, always ask your healthcare professional. [place] [address] any warranty or liability for your use of this information. Low Back Arthritis: Exercises  Introduction  Here are some examples of typical rehabilitation exercises for your condition. Start each exercise slowly. Ease off the exercise if you start to have pain. Your doctor or physical therapist will tell you when you can start these exercises and which ones will work best for you. When you are not being active, find a comfortable position for rest. Some people are comfortable on the floor or a medium-firm bed with a small pillow under their head and another under their knees. Some people prefer to lie on their side with a pillow between their knees. Don't stay in one position for too long. Take short walks (10 to 20 minutes) every 2 to 3 hours. Avoid slopes, hills, and stairs until you feel better. Walk only distances you can manage without pain, especially leg pain. How to do the exercises  Pelvic tilt    Lie on your back with your knees bent. \"Brace\" your stomach--tighten your muscles by pulling in and imagining your belly button moving toward your spine. Press your lower back into the floor. You should feel your hips and pelvis rock back. Hold for 6 seconds while breathing smoothly. Relax and allow your pelvis and hips to rock forward. Repeat 8 to 12 times. Back stretches    Get down on your hands and knees on the floor. Relax your head and allow it to droop.  Round your back up toward the ceiling until you feel a nice stretch in your upper, middle, and lower back. Hold this stretch for as long as it feels comfortable, or about 15 to 30 seconds. Return to the starting position with a flat back while you are on your hands and knees. Let your back sway by pressing your stomach toward the floor. Lift your buttocks toward the ceiling. Hold this position for 15 to 30 seconds. Repeat 2 to 4 times. Follow-up care is a key part of your treatment and safety. Be sure to make and go to all appointments, and call your doctor if you are having problems. It's also a good idea to know your test results and keep a list of the medicines you take. Where can you learn more? Go to http://www.soriano.com/  Enter T094 in the search box to learn more about \"Low Back Arthritis: Exercises. \"  Current as of: July 1, 2021               Content Version: 13.2  © 2006-2022 [employer]. Care instructions adapted under license by [employer] (which disclaims liability or warranty for this information). If you have questions about a medical condition or this instruction, always ask your healthcare professional. [place] [address] any warranty or liability for your use of this information.

## 2022-10-03 NOTE — PROGRESS NOTES
Leonarda Garza presents today for   Chief Complaint   Patient presents with    Leg Pain    Back Pain    Hip Pain       Is someone accompanying this pt? no    Is the patient using any DME equipment during OV? no    Depression Screening:  No flowsheet data found. Learning Assessment:  No flowsheet data found. Abuse Screening:  No flowsheet data found. Fall Risk  Fall Risk Assessment, last 12 mths 10/3/2022   Able to walk? Yes   Fall in past 12 months? 0   Do you feel unsteady? 0   Are you worried about falling 0       OPIOID RISK TOOL  No flowsheet data found. Coordination of Care:  1. Have you been to the ER, urgent care clinic since your last visit? no  Hospitalized since your last visit? no    2. Have you seen or consulted any other health care providers outside of the [address] [place] [address] since your last visit? no Include any pap smears or colon screening.  no

## 2022-11-10 ENCOUNTER — HOSPITAL ENCOUNTER (OUTPATIENT)
Dept: MAMMOGRAPHY | Age: 77
Discharge: HOME | End: 2022-11-10
Attending: NURSE PRACTITIONER
Payer: MEDICARE

## 2022-11-10 DIAGNOSIS — Z12.31 VISIT FOR SCREENING MAMMOGRAM: ICD-10-CM

## 2022-11-10 PROCEDURE — 77063 BREAST TOMOSYNTHESIS BI: CPT

## 2022-11-14 NOTE — PROGRESS NOTES
[facility]  [address] [place], [facility] [address]  Phone: [phone]  Fax: [phone]    Pt's YOB: 1945    ASSESSMENT   Diagnoses and all orders for this visit:    1. Spinal stenosis of lumbar region without neurogenic claudication    2. Lumbar radiculitis  -     SCHEDULE SURGERY  -     EMG ONE EXTREMITY LOWER RT; Future    3. Lumbar facet arthropathy    4. Muscle spasm       IMPRESSION AND PLAN:  Praveena Suarez is a 67 y.o. right hand dominant female with history of lumbar pain and presents to the office today for medication follow up, accompanied by her daughter. Pt continues to complain of pain in the lumbar region intermittently radiating into the right leg towards the foot, with numbness in the left leg. She is taking Neurontin 300 mg 1 cap QAM and 2 caps QHS with nausea after the morning dose, ibuprofen 800 mg as needed, prescribed by her PCP. She notes benefit and sedation with the Neurontin and relief with previously prescribed Medrol Dosepak. 1) Pt was given information on herniated disc and lumbar arthritis exercises. 2) Lumbar spine MRI from 06/30/2022 was reviewed with the patient at today's office visit. 3) RLE EMG was ordered at today's office visit to assess for entrapment and radiculopathy. 4) A right L4 and right L5 SNRB was ordered at this time. 5) She will continue taking Neurontin 300 mg 1 cap QAM and 2 caps QHS. Refills are not required at this time. 6) Ms. Zoltan Mcdonough has a reminder for a \"due or due soon\" health maintenance. I have asked that she contact her primary care provider, Ghislaine Junior NP, for follow-up on this health maintenance. 7)  demonstrated consistency with prescribing. Follow-up and Dispositions    Return in about 7 weeks (around 1/3/2023) for follow up , Medication follow up; emg with Dr Meliza Domingo, Diagnostic Test follow up.              HISTORY OF PRESENT ILLNESS:  Praveena Suarez is a 67 y.o. right hand dominant female with history of lumbar pain and presents to the office today for medication follow up, accompanied by her daughter. Pt continues to complain of pain in the lumbar region intermittently radiating into the right leg towards the foot, with numbness in the left leg. She reports a new onset of pain radiating into the lateral posterior aspect of the right leg and denies left sided symptoms. Pt denies use of anticoagulants and admits to hx of diabetes mellitus with well controlled blood sugar levels. She admits to improvement in her symptoms since her last office visit with less pain in the right leg and more of a heaviness sensation at this time. Pt is taking Neurontin 300 mg 1 cap QAM and 2 caps QHS with nausea after the morning dose, ibuprofen 800 mg as needed, prescribed by her PCP. She notes benefit and sedation with the Neurontin and relief with previously prescribed Medrol Dosepak. Pt at this time desires to proceed with a right L4 and right L5 SNRB.     Pain Scale: 6/10    PCP: Leopoldo Aline, NP     Past Medical History:   Diagnosis Date    Diabetes mellitus ([facility] [address]     states diet controlled    High blood pressure     Menopause         Social History     Socioeconomic History    Marital status:      Spouse name: Not on file    Number of children: Not on file    Years of education: Not on file    Highest education level: Not on file   Occupational History    Not on file   Tobacco Use    Smoking status: Never    Smokeless tobacco: Never   Vaping Use    Vaping Use: Never used   Substance and Sexual Activity    Alcohol use: No    Drug use: No    Sexual activity: Not on file   Other Topics Concern    Not on file   Social History Narrative    Not on file     Social Determinants of Health     Financial Resource Strain: Not on file   Food Insecurity: Not on file   Transportation Needs: Not on file   Physical Activity: Not on file   Stress: Not on file   Social Connections: Not on file   Intimate Partner Violence: Not on file   Housing Stability: Not on file       Current Outpatient Medications   Medication Sig Dispense Refill    gabapentin (NEURONTIN) 300 mg capsule Take 1 cap by mouth in the morning and 2 caps at night as directed. 90 Capsule 1    BD Single Use Swabs Regular padm       biotin 1,000 mcg chew Take  by mouth. True Metrix Glucose Test Strip strip       cholecalciferol (VITAMIN D3) (1000 Units /25 mcg) tablet Take 1,000 Units by mouth daily. Jardiance 10 mg tablet       ibuprofen (MOTRIN) 800 mg tablet TAKE 1 TABLET BY MOUTH EVERY 8 HOURS WITH FOOD AS NEEDED      losartan (COZAAR) 25 mg tablet       multivitamins-minerals-lutein (MEN'S CENTRUM SILVER WITH LUTEIN) tab tablet Take 1 Tablet by mouth daily. omega 3-dha-epa-fish oil 60- mg cap Take 500 mg by mouth. rosuvastatin (CRESTOR) 5 mg tablet       coenzyme Q-10 (CO Q-10) 30 mg capsule Take 30 mg by mouth. AMLODIPINE BESYLATE (NORVASC PO) Take 5 mg by mouth daily. indapamide (LOZOL) 1.25 mg tablet Take 1.25 mg by mouth daily. methylPREDNISolone (MEDROL DOSEPACK) 4 mg tablet Per dose pack instructions (Patient not taking: Reported on 11/15/2022) 1 Dose Pack 0    gabapentin (NEURONTIN) 100 mg capsule Take 1 cap by mouth in the morning and 1 at night as directed. Indications: neuropathic pain (Patient not taking: Reported on 11/15/2022) 60 Capsule 1    magnesium oxide (MAG-OX) 400 mg tablet Take 400 mg by mouth daily. (Patient not taking: Reported on 11/15/2022)      methylPREDNISolone (MEDROL DOSEPACK) 4 mg tablet Per dose pack instructions (Patient not taking: Reported on 11/15/2022) 1 Dose Pack 0    SULFAMETHOXAZOLE/TRIMETHOPRIM (BACTRIM DS PO) Take  by mouth. (Patient not taking: No sig reported)         Allergies   Allergen Reactions    Codeine Itching    Lisinopril Itching         REVIEW OF SYSTEMS    Constitutional: Negative for fever, chills, or weight change.    Respiratory: Negative for cough or shortness of breath. Cardiovascular: Negative for chest pain or palpitations. Gastrointestinal: Negative for acid reflux, change in bowel habits, or constipation. Genitourinary: Negative for dysuria and flank pain. Musculoskeletal: Positive for lumbar and right leg pain. Skin: Negative for rash. Neurological: Negative for headaches or dizziness. Positive for left leg numbness. Endo/Heme/Allergies: Negative for increased bruising. Psychiatric/Behavioral: Negative for difficulty with sleep. As per HPI    PHYSICAL EXAMINATION  Visit Vitals  Pulse 85   Temp 96.8 °F (36 °C) (Temporal)   Wt 132 lb (59.9 kg)   SpO2 96%   BMI 25.78 kg/m²       Constitutional: Awake, alert, and in no acute distress. Neurological: 1+ symmetrical DTRs in the upper extremities. 1+ symmetrical DTRs in the lower extremities. Sensation to light touch is intact. Negative Garrison's sign bilaterally. Skin: warm, dry, and intact. Musculoskeletal: Pain with extension. No pain with axial loading. No change with forward flexion. No pain with internal or external rotation of her hips. Negative straight leg raise bilaterally. Biceps  Triceps Deltoids Wrist Ext Wrist Flex Hand Intrin   Right +4/5 +4/5 +4/5 +4/5 +4/5 +4/5   Left +4/5 +4/5 +4/5 +4/5 +4/5 +4/5      Hip Flex  Quads Hamstrings Ankle DF EHL Ankle PF   Right +4/5 +4/5 +4/5 +4/5 +4/5 +4/5   Left +4/5 +4/5 +4/5 +4/5 +4/5 +4/5     IMAGING:    Lumbar spine MRI from 06/30/2022 was personally reviewed and demonstrated with the patient:      Narrative  MRI LUMB SPINE WO CONT: 6/30/2022 11:04 AM     CLINICAL INFORMATION  Lower back pain with lower extremity radicular symptoms, lumbar facet  arthropathy     COMPARISON  Lumbar spine radiographs November 4, 2019, lumbar spine MRI 9 August 2012     TECHNIQUE  Multiplanar MR images of the lumbar spine obtained including T1 and T2-weighted  sequences.      FINDINGS  For discussion purposes, the first axial T2-weighted image defines the superior  endplate of K62. Vertebral body height and alignment: Levoconvex curvature of the lumbar spine. Grade 1 anterolisthesis of T11 with respect to T12 and T12 with respect L1. Grade 1 retrolisthesis of L1 with respect to L2, L2 with respect to L3, L3 with  respect to L4 and L4 with respect L5. No evidence of acute fracture. Bone marrow signal: No pathologic infiltration. Disproportionate endplate  reactive marrow edema at L1-L2 and L3-L4. Conus/filum: Normal in appearance and terminates at an appropriate level. Tarlov  cysts are noted. Intervertebral disc height: Disproportionate intervertebral disc space narrowing  at L1-L2, L2-L3, and L3-L4. Paraspinal tissues: Circumscribed T2 hyperintensity of the right kidney is  incompletely characterized, statistically a benign cyst.     Specific segmental anatomy is as follows:     T9-T10 and T10-T11: Evaluated only on sagittal sequences. Mild broad-based disc  bulge at these levels without significant central canal or foraminal narrowing. T11-T12: Mild broad-based disc bulge asymmetric to the left. No significant  central canal or right foraminal narrowing. Mild/moderate left foraminal  narrowing. T12-L1: Grade 1 anterolisthesis. Broad-based disc bulge with superimposed right  paracentral/subarticular protrusion type disc herniation. Mild central canal  narrowing. No significant foraminal narrowing. L1-2: Grade 1 retrolisthesis. Broad-based disc osteophyte complex. Mild central  canal narrowing. No significant foraminal narrowing. L2-3: Grade 1 retrolisthesis. Broad-based disc osteophyte complex. Mild central  canal narrowing. No significant left foraminal narrowing. Mild right foraminal  narrowing. L3-4: Grade 1 retrolisthesis. Broad-based disc osteophyte complex asymmetric to  the left. Asymmetric moderate left facet arthropathy. Asymmetric moderate/severe  left foraminal narrowing.  Mild right foraminal narrowing. Mild central canal  narrowing     L4-5: Broad-based disc osteophyte complex superimposed on moderate facet  arthrosis. Asymmetric moderate/severe left foraminal narrowing. Mild/moderate  right foraminal narrowing. Mild central canal narrowing. L5-S1: Broad-based disc osteophyte complex superimposed on mild facet arthrosis. Moderate bilateral foraminal narrowing, left greater than right. Slight  contact/impingement of bilateral S1 nerve roots within their respective  subarticular recess course. No significant central canal narrowing     Impression  When compared with 2012 lumbar spine MRI, progressive degenerative changes  throughout the lumbar spine in the setting of levoconvex curvature and  multilevel grade 1 spondylolisthesis. Foraminal narrowing most advanced on the left at L3-L4 and L4-L5. No high-grade central canal narrowing throughout the lumbar spinal column. Please refer to the body of the report for a comprehensive segmental analysis of  the lumbar spinal column. Lumbar x-rays from 04/13/2022 were personally reviewed and demonstrated:     Results  XR SPINE LUMB COMP W BEND (Accession [id number]) (Order [id number])     Allergies        Medium: Codeine; Lisinopril      Exam Information     Status Exam Begun   Exam Ended     Final [99] 4/13/2022 17:04 4/13/2022  5:16 PM [id number]  5:16 PM      Result Information     Status: Final result (Exam End: 4/13/2022 17:16) Provider Status: Open      XR SPINE LUMB COMP W BEND: Patient Communication     Add Comments  Not seen         Study Result     Narrative & Impression   XR SPINE LUMB COMP W BEND: 4/13/2022 5:16 PM     CLINICAL INFORMATION  Acute bilateral low back pain with bilateral sciatica. COMPARISON  Lumbar spine MRI August 9, 2012, lumbar spine radiographs for November 2019     TECHNIQUE  7 views of the lumbar spine     FINDINGS:  Mild levoconvex curvature of the lumbar spine.      Reversal of the expected lumbar lordosis. Vertebral body heights are preserved. Grade 1 anterolisthesis of T11 with respect to T12 and T12 with respect L1. Grade 1 retrolisthesis of L3 with respect L4 and L4 with respect L5. No subluxation on dynamic imaging. No spondylolysis. Moderate and/or severe discogenic degenerative change throughout the lumbar  spine with intervertebral disc space narrowing and endplate osteophytosis. IMPRESSION  No acute osseous findings. Advanced discogenic degenerative change with multilevel grade 1  spondylolisthesis and reversal of the expected lumbar lordosis. Similar to 2019  exam.         Cervical 2V x-ray 06/14/2022 were personally reviewed and demonstrated:  Straightening of the cervical spine, anterior osteophyte C4/5 and degenerative cervical facets    Written by Tanna Palmer, as dictated by Claudean Pilling, MD.  I, Dr. Claudean Pilling confirm that all documentation is accurate.

## 2022-11-15 ENCOUNTER — OFFICE VISIT (OUTPATIENT)
Dept: ORTHOPEDIC SURGERY | Age: 77
End: 2022-11-15
Payer: MEDICARE

## 2022-11-15 VITALS — HEART RATE: 85 BPM | BODY MASS INDEX: 25.78 KG/M2 | OXYGEN SATURATION: 96 % | TEMPERATURE: 96.8 F | WEIGHT: 132 LBS

## 2022-11-15 DIAGNOSIS — M54.16 LUMBAR RADICULITIS: ICD-10-CM

## 2022-11-15 DIAGNOSIS — M62.838 MUSCLE SPASM: ICD-10-CM

## 2022-11-15 DIAGNOSIS — M47.816 LUMBAR FACET ARTHROPATHY: ICD-10-CM

## 2022-11-15 DIAGNOSIS — M48.061 SPINAL STENOSIS OF LUMBAR REGION WITHOUT NEUROGENIC CLAUDICATION: Primary | ICD-10-CM

## 2022-11-15 PROCEDURE — 1123F ACP DISCUSS/DSCN MKR DOCD: CPT | Performed by: PHYSICAL MEDICINE & REHABILITATION

## 2022-11-15 PROCEDURE — G8432 DEP SCR NOT DOC, RNG: HCPCS | Performed by: PHYSICAL MEDICINE & REHABILITATION

## 2022-11-15 PROCEDURE — G8427 DOCREV CUR MEDS BY ELIG CLIN: HCPCS | Performed by: PHYSICAL MEDICINE & REHABILITATION

## 2022-11-15 PROCEDURE — 99213 OFFICE O/P EST LOW 20 MIN: CPT | Performed by: PHYSICAL MEDICINE & REHABILITATION

## 2022-11-15 PROCEDURE — G8536 NO DOC ELDER MAL SCRN: HCPCS | Performed by: PHYSICAL MEDICINE & REHABILITATION

## 2022-11-15 PROCEDURE — G8399 PT W/DXA RESULTS DOCUMENT: HCPCS | Performed by: PHYSICAL MEDICINE & REHABILITATION

## 2022-11-15 PROCEDURE — 1090F PRES/ABSN URINE INCON ASSESS: CPT | Performed by: PHYSICAL MEDICINE & REHABILITATION

## 2022-11-15 PROCEDURE — G8417 CALC BMI ABV UP PARAM F/U: HCPCS | Performed by: PHYSICAL MEDICINE & REHABILITATION

## 2022-11-15 PROCEDURE — 1101F PT FALLS ASSESS-DOCD LE1/YR: CPT | Performed by: PHYSICAL MEDICINE & REHABILITATION

## 2022-11-15 NOTE — PATIENT INSTRUCTIONS
Low Back Arthritis: Exercises  Introduction  Here are some examples of typical rehabilitation exercises for your condition. Start each exercise slowly. Ease off the exercise if you start to have pain. Your doctor or physical therapist will tell you when you can start these exercises and which ones will work best for you. When you are not being active, find a comfortable position for rest. Some people are comfortable on the floor or a medium-firm bed with a small pillow under their head and another under their knees. Some people prefer to lie on their side with a pillow between their knees. Don't stay in one position for too long. Take short walks (10 to 20 minutes) every 2 to 3 hours. Avoid slopes, hills, and stairs until you feel better. Walk only distances you can manage without pain, especially leg pain. How to do the exercises  Pelvic tilt  Lie on your back with your knees bent. \"Brace\" your stomach--tighten your muscles by pulling in and imagining your belly button moving toward your spine. Press your lower back into the floor. You should feel your hips and pelvis rock back. Hold for 6 seconds while breathing smoothly. Relax and allow your pelvis and hips to rock forward. Repeat 8 to 12 times. Back stretches  Get down on your hands and knees on the floor. Relax your head and allow it to droop. Round your back up toward the ceiling until you feel a nice stretch in your upper, middle, and lower back. Hold this stretch for as long as it feels comfortable, or about 15 to 30 seconds. Return to the starting position with a flat back while you are on your hands and knees. Let your back sway by pressing your stomach toward the floor. Lift your buttocks toward the ceiling. Hold this position for 15 to 30 seconds. Repeat 2 to 4 times. Follow-up care is a key part of your treatment and safety. Be sure to make and go to all appointments, and call your doctor if you are having problems.  It's also a good idea to know your test results and keep a list of the medicines you take. Current as of: March 9, 2022               Content Version: 13.4  © 2006-2022 [employer]. Care instructions adapted under license by [employer] (which disclaims liability or warranty for this information). If you have questions about a medical condition or this instruction, always ask your healthcare professional. [place] [address] any warranty or liability for your use of this information. Herniated Disc: Exercises  Introduction  Here are some examples of exercises for you to try. The exercises may be suggested for a condition or for rehabilitation. Start each exercise slowly. Ease off the exercises if you start to have pain. You will be told when to start these exercises and which ones will work best for you. How to stay safe  These exercises can help you move easier and feel better. But when you first start doing them, you may have more pain in your back. This is normal. But it is important to pay close attention to your pain during and after each exercise. Keep doing these exercises if your pain stays the same or moves from your leg and buttock more toward the middle of your spine. Pain moving out of your leg and buttock is a good sign. Stop doing these exercises if your pain gets worse in your leg and buttock. Stop if you start to have pain in your leg and buttock that you didn't have before. Be sure to do these exercises in the order they appear. Note how your pain changes before you move to the next one. If your pain is much worse right after exercise and stays worse the next day, do not do any of these exercises. How to do the exercises  1. Rest on belly  Lie on your stomach, with your head turned to the side. Try to relax your lower back muscles as much as you can. Continue to lie on your stomach for 2 minutes. Keep your arms beside your body.   If that position bothers your neck, place your hands, one on top of the other, underneath your forehead. This will help support your head and neck. If lying in this position causes or increases pain down your leg, stop this exercise and do not do the next exercises. 2. Press-up back extension  Lie on your stomach, with your face down and your elbows tucked into your sides and under your shoulders. Press your elbows down into the floor to raise your upper back. Hold this position for 15 to 30 seconds. Repeat 2 to 4 times. As you do this, relax your stomach muscles and allow your back to arch without using your back muscles. Let your low back relax completely as you arch up. Don't let your hips or pelvis come off the floor. Then relax, and return to the start position. Over time, work up to staying in the press-up position for up to 2 minutes. If lying in this position causes or increases pain down your leg, stop this exercise and do not do the next exercises. 3. Full press-up back extension  Lie on your stomach with your face down, keeping your elbows tucked into your sides and under your shoulders. Straighten your elbows, and push your upper body up as far as you can. Hold this position for 5 seconds, and then relax. Repeat 10 times. Allow your lower back to sag. Keep your hips, pelvis, and legs relaxed. Each time, try to raise your upper body a little higher and hold your arms a bit straighter. If lying in this position causes or increases pain down your leg, stop this exercise and do not do the next exercises. If you can't do this exercise, you may instead try the backward bend exercise that follows. 4. Backward bend  Stand with your feet hip-width apart, and don't lock your knees. Place your hands in the small of your back. Bend backward as far as you can, keeping your knees straight. Repeat 2 to 4 times. Your toes should be pointing forward. Hold this position for 2 to 3 seconds.  Then return to your starting position. Each time, try to bend backward a little farther, until you bend backward as far as you can. If standing in this position causes or increases pain down your leg, stop this exercise. Follow-up care is a key part of your treatment and safety. Be sure to make and go to all appointments, and call your doctor if you are having problems. It's also a good idea to know your test results and keep a list of the medicines you take. Current as of: March 9, 2022               Content Version: 13.4  © 2006-2022 [employer]. Care instructions adapted under license by [employer] (which disclaims liability or warranty for this information). If you have questions about a medical condition or this instruction, always ask your healthcare professional. [address] any warranty or liability for your use of this information.

## 2022-11-15 NOTE — LETTER
11/17/2022    Patient: Mallory Peters   YOB: 1945   Date of Visit: 11/15/2022     Trung Rehman NP  [facility]  [address] [address]  Via Fax: [phone]    Dear Trung Rehman NP,      Thank you for referring Ms. Thomas Allne to [facility] for evaluation. My notes for this consultation are attached. If you have questions, please do not hesitate to call me. I look forward to following your patient along with you.       Sincerely,    Aniyah Torres MD

## 2022-11-16 DIAGNOSIS — M54.16 LUMBAR RADICULITIS: ICD-10-CM

## 2022-11-23 ENCOUNTER — TELEPHONE (OUTPATIENT)
Dept: ORTHOPEDIC SURGERY | Age: 77
End: 2022-11-23

## 2022-11-23 NOTE — TELEPHONE ENCOUNTER
Patient called on 11/23/2022 at 1:00pm to state that she wanted to cancel her block procedure at this time with no reschedule.

## 2022-12-16 ENCOUNTER — TELEPHONE (OUTPATIENT)
Dept: ORTHOPEDIC SURGERY | Age: 77
End: 2022-12-16

## 2022-12-16 DIAGNOSIS — M51.36 DDD (DEGENERATIVE DISC DISEASE), LUMBAR: ICD-10-CM

## 2022-12-16 DIAGNOSIS — M54.50 LUMBAR PAIN WITH RADIATION DOWN BOTH LEGS: ICD-10-CM

## 2022-12-16 DIAGNOSIS — M48.061 SPINAL STENOSIS OF LUMBAR REGION WITHOUT NEUROGENIC CLAUDICATION: Primary | ICD-10-CM

## 2022-12-16 DIAGNOSIS — M79.604 LUMBAR PAIN WITH RADIATION DOWN BOTH LEGS: ICD-10-CM

## 2022-12-16 DIAGNOSIS — M54.16 LUMBAR RADICULITIS: ICD-10-CM

## 2022-12-16 DIAGNOSIS — M79.605 LUMBAR PAIN WITH RADIATION DOWN BOTH LEGS: ICD-10-CM

## 2022-12-16 DIAGNOSIS — M47.816 LUMBAR FACET ARTHROPATHY: ICD-10-CM

## 2022-12-16 NOTE — TELEPHONE ENCOUNTER
----- Message from Tami Carvalho MD sent at 12/16/2022  9:48 AM EST -----  Regarding: FW: PATIENT REQUESTING PT  Please order PT for pt -- thanks  ----- Message -----  From: [place]  Sent: 11/16/2022   9:50 AM EST  To: Tami Carvalho MD  Subject: PATIENT REQUESTING PT                            Dr. Marianne Champion-  When I was scheduling the SNRB for this patient, she was requesting Physical Therapy. Patient is scheduled for the block on 12/13/2022 with Dr. Katrina Gusman. I told them(her & her daughter) I would ask you about ordering it and I would get back to them. Please let me know if you do order it for her.   Thank you,  Johnathan Toro

## 2023-08-01 ENCOUNTER — HOSPITAL ENCOUNTER (OUTPATIENT)
Facility: HOSPITAL | Age: 78
Discharge: HOME | End: 2023-08-04
Payer: MEDICARE

## 2023-08-01 DIAGNOSIS — M54.12 CERVICAL RADICULOPATHY: ICD-10-CM

## 2023-08-01 PROCEDURE — 70360 X-RAY EXAM OF NECK: CPT

## 2023-11-13 ENCOUNTER — TRANSCRIBE ORDERS (OUTPATIENT)
Facility: HOSPITAL | Age: 78
End: 2023-11-13

## 2023-11-13 DIAGNOSIS — Z12.31 VISIT FOR SCREENING MAMMOGRAM: Primary | ICD-10-CM

## 2023-11-14 ENCOUNTER — HOSPITAL ENCOUNTER (OUTPATIENT)
Facility: HOSPITAL | Age: 78
Discharge: HOME | End: 2023-11-17
Payer: MEDICARE

## 2023-11-14 VITALS — WEIGHT: 132 LBS | HEIGHT: 61 IN | BODY MASS INDEX: 24.92 KG/M2

## 2023-11-14 DIAGNOSIS — Z12.31 VISIT FOR SCREENING MAMMOGRAM: ICD-10-CM

## 2023-11-14 PROCEDURE — 77063 BREAST TOMOSYNTHESIS BI: CPT

## 2023-12-07 ENCOUNTER — HOSPITAL ENCOUNTER (OUTPATIENT)
Facility: HOSPITAL | Age: 78
Discharge: HOME | End: 2023-12-07
Payer: MEDICARE

## 2023-12-07 ENCOUNTER — HOSPITAL ENCOUNTER (OUTPATIENT)
Facility: HOSPITAL | Age: 78
End: 2023-12-07
Payer: MEDICARE

## 2023-12-07 DIAGNOSIS — M85.89 OTHER SPECIFIED DISORDERS OF BONE DENSITY AND STRUCTURE, MULTIPLE SITES: ICD-10-CM

## 2023-12-07 LAB
ALBUMIN SERPL-MCNC: 4.3 G/DL (ref 3.4–5)
ALBUMIN/GLOB SERPL: 1.4 (ref 0.8–1.7)
ALP SERPL-CCNC: 93 U/L (ref 45–117)
ALT SERPL-CCNC: 32 U/L (ref 13–56)
ANION GAP SERPL CALC-SCNC: 4 MMOL/L (ref 3–18)
AST SERPL-CCNC: 18 U/L (ref 10–38)
BASOPHILS # BLD: 0 K/UL (ref 0–0.1)
BASOPHILS NFR BLD: 0 % (ref 0–2)
BILIRUB SERPL-MCNC: 0.9 MG/DL (ref 0.2–1)
BUN SERPL-MCNC: 25 MG/DL (ref 7–18)
BUN/CREAT SERPL: 36 (ref 12–20)
CALCIUM SERPL-MCNC: 9 MG/DL (ref 8.5–10.1)
CHLORIDE SERPL-SCNC: 107 MMOL/L (ref 100–111)
CHOLEST SERPL-MCNC: 195 MG/DL
CO2 SERPL-SCNC: 28 MMOL/L (ref 21–32)
CREAT SERPL-MCNC: 0.69 MG/DL (ref 0.6–1.3)
DIFFERENTIAL METHOD BLD: ABNORMAL
EOSINOPHIL # BLD: 0.1 K/UL (ref 0–0.4)
EOSINOPHIL NFR BLD: 1 % (ref 0–5)
ERYTHROCYTE [DISTWIDTH] IN BLOOD BY AUTOMATED COUNT: 13.4 % (ref 11.6–14.5)
GLOBULIN SER CALC-MCNC: 3.1 G/DL (ref 2–4)
GLUCOSE SERPL-MCNC: 111 MG/DL (ref 74–99)
HBA1C MFR BLD: 6.5 % (ref 4.2–5.6)
HCT VFR BLD AUTO: 46 % (ref 35–45)
HDLC SERPL-MCNC: 72 MG/DL (ref 40–60)
HDLC SERPL: 2.7 (ref 0–5)
HGB BLD-MCNC: 14.6 G/DL (ref 12–16)
IMM GRANULOCYTES # BLD AUTO: 0 K/UL (ref 0–0.04)
IMM GRANULOCYTES NFR BLD AUTO: 0 % (ref 0–0.5)
LDLC SERPL CALC-MCNC: 109.6 MG/DL (ref 0–100)
LIPID PANEL: ABNORMAL
LYMPHOCYTES # BLD: 2.1 K/UL (ref 0.9–3.6)
LYMPHOCYTES NFR BLD: 31 % (ref 21–52)
MCH RBC QN AUTO: 30 PG (ref 24–34)
MCHC RBC AUTO-ENTMCNC: 31.7 G/DL (ref 31–37)
MCV RBC AUTO: 94.5 FL (ref 78–100)
MONOCYTES # BLD: 0.5 K/UL (ref 0.05–1.2)
MONOCYTES NFR BLD: 7 % (ref 3–10)
NEUTS SEG # BLD: 4.2 K/UL (ref 1.8–8)
NEUTS SEG NFR BLD: 60 % (ref 40–73)
NRBC # BLD: 0 K/UL (ref 0–0.01)
NRBC BLD-RTO: 0 PER 100 WBC
PLATELET # BLD AUTO: 227 K/UL (ref 135–420)
PMV BLD AUTO: 10.2 FL (ref 9.2–11.8)
POTASSIUM SERPL-SCNC: 4 MMOL/L (ref 3.5–5.5)
PROT SERPL-MCNC: 7.4 G/DL (ref 6.4–8.2)
RBC # BLD AUTO: 4.87 M/UL (ref 4.2–5.3)
SODIUM SERPL-SCNC: 139 MMOL/L (ref 136–145)
TRIGL SERPL-MCNC: 67 MG/DL
TSH SERPL DL<=0.05 MIU/L-ACNC: 1.66 UIU/ML (ref 0.36–3.74)
VLDLC SERPL CALC-MCNC: 13.4 MG/DL
WBC # BLD AUTO: 7 K/UL (ref 4.6–13.2)

## 2023-12-07 PROCEDURE — 80061 LIPID PANEL: CPT

## 2023-12-07 PROCEDURE — 84443 ASSAY THYROID STIM HORMONE: CPT

## 2023-12-07 PROCEDURE — 36415 COLL VENOUS BLD VENIPUNCTURE: CPT

## 2023-12-07 PROCEDURE — 85025 COMPLETE CBC W/AUTO DIFF WBC: CPT

## 2023-12-07 PROCEDURE — 77080 DXA BONE DENSITY AXIAL: CPT

## 2023-12-07 PROCEDURE — 83036 HEMOGLOBIN GLYCOSYLATED A1C: CPT

## 2023-12-07 PROCEDURE — 80053 COMPREHEN METABOLIC PANEL: CPT
